# Patient Record
Sex: FEMALE | Race: BLACK OR AFRICAN AMERICAN | NOT HISPANIC OR LATINO | URBAN - METROPOLITAN AREA
[De-identification: names, ages, dates, MRNs, and addresses within clinical notes are randomized per-mention and may not be internally consistent; named-entity substitution may affect disease eponyms.]

---

## 2019-07-30 ENCOUNTER — EMERGENCY (EMERGENCY)
Facility: HOSPITAL | Age: 29
LOS: 1 days | Discharge: HOME | End: 2019-07-30
Attending: EMERGENCY MEDICINE | Admitting: EMERGENCY MEDICINE
Payer: MEDICAID

## 2019-07-30 VITALS
RESPIRATION RATE: 16 BRPM | HEART RATE: 66 BPM | TEMPERATURE: 98 F | OXYGEN SATURATION: 100 % | SYSTOLIC BLOOD PRESSURE: 124 MMHG | DIASTOLIC BLOOD PRESSURE: 81 MMHG

## 2019-07-30 VITALS
HEART RATE: 69 BPM | SYSTOLIC BLOOD PRESSURE: 132 MMHG | TEMPERATURE: 98 F | WEIGHT: 149.91 LBS | HEIGHT: 68 IN | DIASTOLIC BLOOD PRESSURE: 85 MMHG | OXYGEN SATURATION: 99 % | RESPIRATION RATE: 18 BRPM

## 2019-07-30 DIAGNOSIS — Z98.890 OTHER SPECIFIED POSTPROCEDURAL STATES: Chronic | ICD-10-CM

## 2019-07-30 DIAGNOSIS — N80.1 ENDOMETRIOSIS OF OVARY: Chronic | ICD-10-CM

## 2019-07-30 LAB
ALBUMIN SERPL ELPH-MCNC: 3.9 G/DL — SIGNIFICANT CHANGE UP (ref 3.4–5)
ALP SERPL-CCNC: 75 U/L — SIGNIFICANT CHANGE UP (ref 40–120)
ALT FLD-CCNC: 23 U/L — SIGNIFICANT CHANGE UP (ref 12–42)
ANION GAP SERPL CALC-SCNC: 9 MMOL/L — SIGNIFICANT CHANGE UP (ref 9–16)
APPEARANCE UR: CLEAR — SIGNIFICANT CHANGE UP
AST SERPL-CCNC: 20 U/L — SIGNIFICANT CHANGE UP (ref 15–37)
BASOPHILS NFR BLD AUTO: 1.2 % — SIGNIFICANT CHANGE UP (ref 0–2)
BILIRUB SERPL-MCNC: 0.3 MG/DL — SIGNIFICANT CHANGE UP (ref 0.2–1.2)
BILIRUB UR-MCNC: NEGATIVE — SIGNIFICANT CHANGE UP
BUN SERPL-MCNC: 14 MG/DL — SIGNIFICANT CHANGE UP (ref 7–23)
CALCIUM SERPL-MCNC: 8.5 MG/DL — SIGNIFICANT CHANGE UP (ref 8.5–10.5)
CHLORIDE SERPL-SCNC: 106 MMOL/L — SIGNIFICANT CHANGE UP (ref 96–108)
CO2 SERPL-SCNC: 24 MMOL/L — SIGNIFICANT CHANGE UP (ref 22–31)
COLOR SPEC: YELLOW — SIGNIFICANT CHANGE UP
CREAT SERPL-MCNC: 0.7 MG/DL — SIGNIFICANT CHANGE UP (ref 0.5–1.3)
DIFF PNL FLD: ABNORMAL
EOSINOPHIL NFR BLD AUTO: 3 % — SIGNIFICANT CHANGE UP (ref 0–6)
GLUCOSE SERPL-MCNC: 85 MG/DL — SIGNIFICANT CHANGE UP (ref 70–99)
GLUCOSE UR QL: NEGATIVE — SIGNIFICANT CHANGE UP
HCG UR QL: NEGATIVE — SIGNIFICANT CHANGE UP
HCT VFR BLD CALC: 32.8 % — LOW (ref 34.5–45)
HGB BLD-MCNC: 10.5 G/DL — LOW (ref 11.5–15.5)
IMM GRANULOCYTES NFR BLD AUTO: 0.2 % — SIGNIFICANT CHANGE UP (ref 0–1.5)
KETONES UR-MCNC: ABNORMAL MG/DL
LEUKOCYTE ESTERASE UR-ACNC: NEGATIVE — SIGNIFICANT CHANGE UP
LIDOCAIN IGE QN: 174 U/L — SIGNIFICANT CHANGE UP (ref 73–393)
LYMPHOCYTES # BLD AUTO: 44.9 % — HIGH (ref 13–44)
MAGNESIUM SERPL-MCNC: 2 MG/DL — SIGNIFICANT CHANGE UP (ref 1.6–2.6)
MCHC RBC-ENTMCNC: 25.2 PG — LOW (ref 27–34)
MCHC RBC-ENTMCNC: 32 G/DL — SIGNIFICANT CHANGE UP (ref 32–36)
MCV RBC AUTO: 78.7 FL — LOW (ref 80–100)
MONOCYTES NFR BLD AUTO: 8.6 % — SIGNIFICANT CHANGE UP (ref 2–14)
NEUTROPHILS NFR BLD AUTO: 42.1 % — LOW (ref 43–77)
NITRITE UR-MCNC: NEGATIVE — SIGNIFICANT CHANGE UP
PH UR: 5.5 — SIGNIFICANT CHANGE UP (ref 5–8)
PLATELET # BLD AUTO: 259 K/UL — SIGNIFICANT CHANGE UP (ref 150–400)
POTASSIUM SERPL-MCNC: 3.9 MMOL/L — SIGNIFICANT CHANGE UP (ref 3.5–5.3)
POTASSIUM SERPL-SCNC: 3.9 MMOL/L — SIGNIFICANT CHANGE UP (ref 3.5–5.3)
PROT SERPL-MCNC: 7.6 G/DL — SIGNIFICANT CHANGE UP (ref 6.4–8.2)
PROT UR-MCNC: ABNORMAL MG/DL
RBC # BLD: 4.17 M/UL — SIGNIFICANT CHANGE UP (ref 3.8–5.2)
RBC # FLD: 15.4 % — HIGH (ref 10.3–14.5)
SODIUM SERPL-SCNC: 139 MMOL/L — SIGNIFICANT CHANGE UP (ref 132–145)
SP GR SPEC: >=1.03 — SIGNIFICANT CHANGE UP (ref 1–1.03)
UROBILINOGEN FLD QL: 0.2 E.U./DL — SIGNIFICANT CHANGE UP
WBC # BLD: 4.3 K/UL — SIGNIFICANT CHANGE UP (ref 3.8–10.5)
WBC # FLD AUTO: 4.3 K/UL — SIGNIFICANT CHANGE UP (ref 3.8–10.5)

## 2019-07-30 PROCEDURE — 76856 US EXAM PELVIC COMPLETE: CPT | Mod: 26

## 2019-07-30 PROCEDURE — 99284 EMERGENCY DEPT VISIT MOD MDM: CPT

## 2019-07-30 PROCEDURE — 76830 TRANSVAGINAL US NON-OB: CPT | Mod: 26

## 2019-07-30 NOTE — ED PROVIDER NOTE - PLAN OF CARE
28f w anemia & endometriosis hx p/w 1 mo of vaginal bleeding. mild bleeding on exam. nontoxic appearing, n/v intact. no abd/pelvic tender. --Labs, US pelvis, Analgesia/antiemetics as needed, observe/re-assess, likely dc w symptomatic and supportive care, f/u Gyn/PMD.

## 2019-07-30 NOTE — ED PROVIDER NOTE - PHYSICAL EXAMINATION
Physical Exam  General: Awake, alert, NAD, WDWN, non-toxic appearing, NCAT  Eyes: PERRL, EOMI, no icterus, lids and conjunctivae are normal  ENT: External inspection normal, pink/moist membranes, pharynx normal  CV: S1S2, regular rate and rhythm, no murmur/gallops/rubs, no JVD, 2+ pulses b/l, no edema/cords/homans, warm/well-perfused  Respiratory: Normal respiratory rate/effort, no respiratory distress, normal voice, speaking full sentences, lungs clear to auscultation b/l, no wheezing/rales/rhonchi, no retractions, no stridor  Abdomen: Soft abdomen, no tender/distended/guarding/rebound, no CVA tender  : Normal external genitals, no external/internal lesions, os closed, no cmt, no fundal/adnexal tender, no fundal/adnexal mass, mild bleeding, assisted by: NATALIYA Monk  Musculoskeletal: FROM all 4 extremities, N/V intact, stable gait  Neck: FROM neck, supple, no meningismus, trachea midline, no JVD  Integumentary: Color normal for race, warm and dry, no rash  Neuro: Oriented x3, CN 2-12 grossly intact, normal motor, normal sensory  Psych: Oriented x3, mood normal, affect normal

## 2019-07-30 NOTE — ED PROVIDER NOTE - OBJECTIVE STATEMENT
28f w a hx of anemia & endometriosis s/p endometrioma resection. Pt reports vaginal bleeding since 7/1. Pt also having pelvic cramping. Symptoms are moderate, constant, no exacerbating. Pt took ASA today for her pain w improvement. No hx of easy bleeding/bruising.

## 2019-07-30 NOTE — ED PROVIDER NOTE - CLINICAL SUMMARY MEDICAL DECISION MAKING FREE TEXT BOX
28f w anemia & endometriosis hx p/w 1 mo of vaginal bleeding. mild bleeding on exam. nontoxic appearing, n/v intact. no abd/pelvic tender. Labs & imaging reviewed. Pt advised regarding symptomatic/supportive care, importance of Gyn/PMD f/u, and symptoms to prompt ED return. Copy of results given to patient.

## 2019-07-30 NOTE — ED PROVIDER NOTE - NSFOLLOWUPCLINICS_GEN_ALL_ED_FT
A Family Medicine Doctor  Family Medicine  .  [place]   Phone:   Fax:   Follow Up Time: 1-3 Days    An OB/GYN physician  Obstetrics & Gynecology  .  [place]   Phone:   Fax:   Follow Up Time: 1-3 Days    [facility]  Family Medicine  [address] [place] [address], [address]nd [place] [address]  Phone: [phone]  Fax:   Follow Up Time: 1-3 Days A Family Medicine Doctor  Family Medicine  .  [place]   Phone:   Fax:   Follow Up Time: 1-3 Days    An OB/GYN physician  Obstetrics & Gynecology  .  [place]   Phone:   Fax:   Follow Up Time: 1-3 Days    [facility]  Family Medicine  [address] [place] [address], [address]nd [place] [address]  Phone: [phone]  Fax:   Follow Up Time: 1-3 Days

## 2019-07-30 NOTE — ED PROVIDER NOTE - NS ED ROS FT
Review of Systems  Constitutional:  No fever or chills.   Eyes:  Negative.   ENMT:  No nasal congestion, discharge, or throat pain.   Cardiac:  No chest pain, palpitations, syncope, or edema.  Respiratory:  No dyspnea, wheezing, or cough. No hemoptysis.  GI:  No vomiting, diarrhea, melena, or hematochezia.  :  No dysuria or hematuria. +Vaginal bleeding  Musculoskeletal:  No gait abnormality, joint swelling, joint pain, or back pain.  Skin:  No skin rash, jaundice, or lesions.  Neuro:  No headache, lightheaded/dizziness, loss of sensation, or focal weakness.  No change in mental status.

## 2019-07-30 NOTE — ED PROVIDER NOTE - CARE PROVIDER_API CALL
Your Primary Care Physician,   Phone: (   )    -  Fax: (   )    -  Follow Up Time: 1-3 Days    Your OB/Gyn,   Phone: (   )    -  Fax: (   )    -  Follow Up Time: 1-3 Days Your Primary Care Physician,   Phone: (   )    -  Fax: (   )    -  Follow Up Time: 1-3 Days    Your OB/Gyn,   Phone: (   )    -  Fax: (   )    -  Follow Up Time: 1-3 Days    Davidson García)  Obstetrics and Gynecology  [address] [place] [address]  Phone: [phone]  Fax: [phone]  Follow Up Time: 4-6 Days

## 2019-07-30 NOTE — ED PROVIDER NOTE - NSFOLLOWUPINSTRUCTIONS_ED_ALL_ED_FT
Return to the ER for worsening or concerning symptoms.    See printed discharge information sheets for further instructions on care for your condition.    Take Ibuprofen 600mg every 6 hours as needed for pain  Take Acetaminophen 650mg every 6 hours as needed for pain    Abnormal Uterine Bleeding  Abnormal uterine bleeding is unusual bleeding from the uterus. It includes:    Bleeding or spotting between periods.  Bleeding after sex.  Bleeding that is heavier than normal.  Periods that last longer than usual.  Bleeding after menopause.    Abnormal uterine bleeding can affect women at various stages in life, including teenagers, women in their reproductive years, pregnant women, and women who have reached menopause. Common causes of abnormal uterine bleeding include:    Pregnancy.  Growths of tissue (polyps).  A noncancerous tumor in the uterus (fibroid).  Infection.  Cancer.  Hormonal imbalances.    Any type of abnormal bleeding should be evaluated by a health care provider. Many cases are minor and simple to treat, while others are more serious. Treatment will depend on the cause of the bleeding.    Follow these instructions at home:  Monitor your condition for any changes.  Do not use tampons, douche, or have sex if told by your health care provider.  Change your pads often.  Get regular exams that include pelvic exams and cervical cancer screening.  Keep all follow-up visits as told by your health care provider. This is important.  Contact a health care provider if:  Your bleeding lasts for more than one week.  You feel dizzy at times.  You feel nauseous or you vomit.  Get help right away if:  You pass out.  Your bleeding soaks through a pad every hour.  You have abdominal pain.  You have a fever.  You become sweaty or weak.  You pass large blood clots from your vagina.  Summary  Abnormal uterine bleeding is unusual bleeding from the uterus.  Any type of abnormal bleeding should be evaluated by a health care provider. Many cases are minor and simple to treat, while others are more serious.  Treatment will depend on the cause of the bleeding.  This information is not intended to replace advice given to you by your health care provider. Make sure you discuss any questions you have with your health care provider.

## 2019-07-30 NOTE — ED ADULT TRIAGE NOTE - CHIEF COMPLAINT QUOTE
pt states shes had her menses since July 1st. Pt states she is not bleeding through multiple pads daily  Also states she has bilateral lower abdominal  pain

## 2019-07-30 NOTE — ED PROVIDER NOTE - CARE PROVIDERS DIRECT ADDRESSES
,DirectAddress_Unknown,DirectAddress_Unknown ,DirectAddress_Unknown,DirectAddress_Unknown,[email or web address]

## 2019-07-30 NOTE — ED PROVIDER NOTE - CARE PLAN
Principal Discharge DX:	Vaginal bleeding  Assessment and plan of treatment:	28f w anemia & endometriosis hx p/w 1 mo of vaginal bleeding. mild bleeding on exam. nontoxic appearing, n/v intact. no abd/pelvic tender. --Labs, US pelvis, Analgesia/antiemetics as needed, observe/re-assess, likely dc w symptomatic and supportive care, f/u Gyn/PMD.

## 2019-08-08 DIAGNOSIS — N93.9 ABNORMAL UTERINE AND VAGINAL BLEEDING, UNSPECIFIED: ICD-10-CM

## 2020-01-16 ENCOUNTER — EMERGENCY (EMERGENCY)
Facility: HOSPITAL | Age: 30
LOS: 1 days | Discharge: HOME | End: 2020-01-16
Admitting: EMERGENCY MEDICINE
Payer: MEDICAID

## 2020-01-16 VITALS
HEIGHT: 68 IN | SYSTOLIC BLOOD PRESSURE: 125 MMHG | RESPIRATION RATE: 16 BRPM | HEART RATE: 79 BPM | OXYGEN SATURATION: 100 % | TEMPERATURE: 98 F | DIASTOLIC BLOOD PRESSURE: 86 MMHG | WEIGHT: 154.98 LBS

## 2020-01-16 VITALS
HEART RATE: 71 BPM | RESPIRATION RATE: 16 BRPM | SYSTOLIC BLOOD PRESSURE: 118 MMHG | TEMPERATURE: 98 F | DIASTOLIC BLOOD PRESSURE: 80 MMHG | OXYGEN SATURATION: 100 %

## 2020-01-16 DIAGNOSIS — N80.1 ENDOMETRIOSIS OF OVARY: Chronic | ICD-10-CM

## 2020-01-16 DIAGNOSIS — Z98.890 OTHER SPECIFIED POSTPROCEDURAL STATES: Chronic | ICD-10-CM

## 2020-01-16 PROBLEM — N80.9 ENDOMETRIOSIS, UNSPECIFIED: Chronic | Status: ACTIVE | Noted: 2019-07-30

## 2020-01-16 PROBLEM — D64.9 ANEMIA, UNSPECIFIED: Chronic | Status: ACTIVE | Noted: 2019-07-30

## 2020-01-16 LAB
ALBUMIN SERPL ELPH-MCNC: 4 G/DL — SIGNIFICANT CHANGE UP (ref 3.4–5)
ALP SERPL-CCNC: 79 U/L — SIGNIFICANT CHANGE UP (ref 40–120)
ALT FLD-CCNC: 30 U/L — SIGNIFICANT CHANGE UP (ref 12–42)
ANION GAP SERPL CALC-SCNC: 7 MMOL/L — LOW (ref 9–16)
APPEARANCE UR: CLEAR — SIGNIFICANT CHANGE UP
APPEARANCE UR: CLEAR — SIGNIFICANT CHANGE UP
APTT BLD: 32.5 SEC — SIGNIFICANT CHANGE UP (ref 27.5–36.3)
AST SERPL-CCNC: 22 U/L — SIGNIFICANT CHANGE UP (ref 15–37)
BASOPHILS # BLD AUTO: 0.04 K/UL — SIGNIFICANT CHANGE UP (ref 0–0.2)
BASOPHILS NFR BLD AUTO: 1 % — SIGNIFICANT CHANGE UP (ref 0–2)
BILIRUB SERPL-MCNC: 0.3 MG/DL — SIGNIFICANT CHANGE UP (ref 0.2–1.2)
BILIRUB UR-MCNC: NEGATIVE — SIGNIFICANT CHANGE UP
BILIRUB UR-MCNC: NEGATIVE — SIGNIFICANT CHANGE UP
BUN SERPL-MCNC: 14 MG/DL — SIGNIFICANT CHANGE UP (ref 7–23)
CALCIUM SERPL-MCNC: 8.8 MG/DL — SIGNIFICANT CHANGE UP (ref 8.5–10.5)
CHLORIDE SERPL-SCNC: 103 MMOL/L — SIGNIFICANT CHANGE UP (ref 96–108)
CO2 SERPL-SCNC: 29 MMOL/L — SIGNIFICANT CHANGE UP (ref 22–31)
COLOR SPEC: YELLOW — SIGNIFICANT CHANGE UP
COLOR SPEC: YELLOW — SIGNIFICANT CHANGE UP
CREAT SERPL-MCNC: 0.72 MG/DL — SIGNIFICANT CHANGE UP (ref 0.5–1.3)
DIFF PNL FLD: NEGATIVE — SIGNIFICANT CHANGE UP
DIFF PNL FLD: NEGATIVE — SIGNIFICANT CHANGE UP
EOSINOPHIL # BLD AUTO: 0.15 K/UL — SIGNIFICANT CHANGE UP (ref 0–0.5)
EOSINOPHIL NFR BLD AUTO: 3.7 % — SIGNIFICANT CHANGE UP (ref 0–6)
GLUCOSE SERPL-MCNC: 98 MG/DL — SIGNIFICANT CHANGE UP (ref 70–99)
GLUCOSE UR QL: NEGATIVE — SIGNIFICANT CHANGE UP
GLUCOSE UR QL: NEGATIVE — SIGNIFICANT CHANGE UP
HCG SERPL-ACNC: 1 MIU/ML — SIGNIFICANT CHANGE UP
HCT VFR BLD CALC: 34.2 % — LOW (ref 34.5–45)
HGB BLD-MCNC: 10.7 G/DL — LOW (ref 11.5–15.5)
IMM GRANULOCYTES NFR BLD AUTO: 0.2 % — SIGNIFICANT CHANGE UP (ref 0–1.5)
INR BLD: 1.09 — SIGNIFICANT CHANGE UP (ref 0.88–1.16)
KETONES UR-MCNC: NEGATIVE — SIGNIFICANT CHANGE UP
KETONES UR-MCNC: NEGATIVE — SIGNIFICANT CHANGE UP
LACTATE SERPL-SCNC: 0.4 MMOL/L — SIGNIFICANT CHANGE UP (ref 0.4–2)
LEUKOCYTE ESTERASE UR-ACNC: ABNORMAL
LEUKOCYTE ESTERASE UR-ACNC: ABNORMAL
LIDOCAIN IGE QN: 138 U/L — SIGNIFICANT CHANGE UP (ref 73–393)
LYMPHOCYTES # BLD AUTO: 1.73 K/UL — SIGNIFICANT CHANGE UP (ref 1–3.3)
LYMPHOCYTES # BLD AUTO: 43.1 % — SIGNIFICANT CHANGE UP (ref 13–44)
MAGNESIUM SERPL-MCNC: 1.7 MG/DL — SIGNIFICANT CHANGE UP (ref 1.6–2.6)
MCHC RBC-ENTMCNC: 24.3 PG — LOW (ref 27–34)
MCHC RBC-ENTMCNC: 31.3 GM/DL — LOW (ref 32–36)
MCV RBC AUTO: 77.6 FL — LOW (ref 80–100)
MONOCYTES # BLD AUTO: 0.36 K/UL — SIGNIFICANT CHANGE UP (ref 0–0.9)
MONOCYTES NFR BLD AUTO: 9 % — SIGNIFICANT CHANGE UP (ref 2–14)
NEUTROPHILS # BLD AUTO: 1.72 K/UL — LOW (ref 1.8–7.4)
NEUTROPHILS NFR BLD AUTO: 43 % — SIGNIFICANT CHANGE UP (ref 43–77)
NITRITE UR-MCNC: NEGATIVE — SIGNIFICANT CHANGE UP
NITRITE UR-MCNC: NEGATIVE — SIGNIFICANT CHANGE UP
NRBC # BLD: 0 /100 WBCS — SIGNIFICANT CHANGE UP (ref 0–0)
PH UR: 5.5 — SIGNIFICANT CHANGE UP (ref 5–8)
PH UR: 5.5 — SIGNIFICANT CHANGE UP (ref 5–8)
PLATELET # BLD AUTO: 272 K/UL — SIGNIFICANT CHANGE UP (ref 150–400)
POTASSIUM SERPL-MCNC: 4.2 MMOL/L — SIGNIFICANT CHANGE UP (ref 3.5–5.3)
POTASSIUM SERPL-SCNC: 4.2 MMOL/L — SIGNIFICANT CHANGE UP (ref 3.5–5.3)
PROT SERPL-MCNC: 7.6 G/DL — SIGNIFICANT CHANGE UP (ref 6.4–8.2)
PROT UR-MCNC: NEGATIVE MG/DL — SIGNIFICANT CHANGE UP
PROT UR-MCNC: NEGATIVE MG/DL — SIGNIFICANT CHANGE UP
PROTHROM AB SERPL-ACNC: 12.1 SEC — SIGNIFICANT CHANGE UP (ref 10–12.9)
RBC # BLD: 4.41 M/UL — SIGNIFICANT CHANGE UP (ref 3.8–5.2)
RBC # FLD: 16.2 % — HIGH (ref 10.3–14.5)
SODIUM SERPL-SCNC: 139 MMOL/L — SIGNIFICANT CHANGE UP (ref 132–145)
SP GR SPEC: 1.01 — SIGNIFICANT CHANGE UP (ref 1–1.03)
SP GR SPEC: >=1.03 — SIGNIFICANT CHANGE UP (ref 1–1.03)
UROBILINOGEN FLD QL: 0.2 E.U./DL — SIGNIFICANT CHANGE UP
UROBILINOGEN FLD QL: 0.2 E.U./DL — SIGNIFICANT CHANGE UP
WBC # BLD: 4.01 K/UL — SIGNIFICANT CHANGE UP (ref 3.8–10.5)
WBC # FLD AUTO: 4.01 K/UL — SIGNIFICANT CHANGE UP (ref 3.8–10.5)

## 2020-01-16 PROCEDURE — 76856 US EXAM PELVIC COMPLETE: CPT | Mod: 26

## 2020-01-16 PROCEDURE — 74177 CT ABD & PELVIS W/CONTRAST: CPT | Mod: 26

## 2020-01-16 PROCEDURE — 76830 TRANSVAGINAL US NON-OB: CPT | Mod: 26

## 2020-01-16 PROCEDURE — 99284 EMERGENCY DEPT VISIT MOD MDM: CPT

## 2020-01-16 RX ORDER — IOHEXOL 300 MG/ML
30 INJECTION, SOLUTION INTRAVENOUS ONCE
Refills: 0 | Status: COMPLETED | OUTPATIENT
Start: 2020-01-16 | End: 2020-01-16

## 2020-01-16 RX ORDER — SODIUM CHLORIDE 9 MG/ML
1000 INJECTION INTRAMUSCULAR; INTRAVENOUS; SUBCUTANEOUS ONCE
Refills: 0 | Status: COMPLETED | OUTPATIENT
Start: 2020-01-16 | End: 2020-01-16

## 2020-01-16 RX ORDER — SODIUM CHLORIDE 9 MG/ML
3 INJECTION INTRAMUSCULAR; INTRAVENOUS; SUBCUTANEOUS ONCE
Refills: 0 | Status: COMPLETED | OUTPATIENT
Start: 2020-01-16 | End: 2020-01-16

## 2020-01-16 RX ORDER — ACETAMINOPHEN 500 MG
650 TABLET ORAL ONCE
Refills: 0 | Status: DISCONTINUED | OUTPATIENT
Start: 2020-01-16 | End: 2020-01-16

## 2020-01-16 RX ADMIN — IOHEXOL 30 MILLILITER(S): 300 INJECTION, SOLUTION INTRAVENOUS at 09:01

## 2020-01-16 RX ADMIN — SODIUM CHLORIDE 1000 MILLILITER(S): 9 INJECTION INTRAMUSCULAR; INTRAVENOUS; SUBCUTANEOUS at 10:22

## 2020-01-16 RX ADMIN — SODIUM CHLORIDE 3 MILLILITER(S): 9 INJECTION INTRAMUSCULAR; INTRAVENOUS; SUBCUTANEOUS at 09:02

## 2020-01-16 NOTE — ED PROVIDER NOTE - CLINICAL SUMMARY MEDICAL DECISION MAKING FREE TEXT BOX
24 y/o F presents to ED c/o lower abd pain.  Pt well appearing, VSS, NAD.  Abd soft, + mid lower abd TTP.

## 2020-01-16 NOTE — ED PROVIDER NOTE - OBJECTIVE STATEMENT
28 y/o F with PMH of endometriosis presents to ED c/o lower abd pain.  Pt states her pain is mid lower abd.  It is intermittent and worsening.  She states she had similar pain 3 years ago and it was related to endometriosis.  She denies fevers/chills, cough, chest pain, n/v/d, dysuria, hematuria, sick contacts, vaginal discharge.  Pt is sexually active with one male partner and denies concerns for STIs.

## 2020-01-16 NOTE — ED PROVIDER NOTE - PATIENT PORTAL LINK FT
You can access the FollowMyHealth Patient Portal offered by [facility] by registering at the following website: [email or web address]. By joining [employer]’s FollowMyHealth portal, you will also be able to view your health information using other applications (apps) compatible with our system.

## 2020-01-16 NOTE — ED PROVIDER NOTE - NSFOLLOWUPINSTRUCTIONS_ED_ALL_ED_FT
Follow up with gynecology and gastroenterology.    Return for fever, vomiting, blood in stool or other concerns.

## 2020-01-22 DIAGNOSIS — R10.30 LOWER ABDOMINAL PAIN, UNSPECIFIED: ICD-10-CM

## 2021-06-20 NOTE — ED ADULT TRIAGE NOTE - TEMPERATURE IN FAHRENHEIT (DEGREES F)
Letter by Laura Castro CNP at      Author: Laura Castro CNP Service: -- Author Type: --    Filed:  Encounter Date: 7/30/2020 Status: (Other)        Brooke Murcia  [address] [place] [address]             August 6, 2020         Dear Brooke Murcia,    Unfortunately, we were not able to reach you by phone.    You will need a Physical in person before paper work can be filled out.     Please call us at [phone] to schedule a check-up.       Electronically signed by Luara Castro CNP       
98.2

## 2022-03-31 ENCOUNTER — EMERGENCY (EMERGENCY)
Facility: HOSPITAL | Age: 32
LOS: 1 days | Discharge: HOME | End: 2022-03-31
Admitting: EMERGENCY MEDICINE
Payer: MEDICAID

## 2022-03-31 VITALS
WEIGHT: 171.96 LBS | DIASTOLIC BLOOD PRESSURE: 86 MMHG | OXYGEN SATURATION: 98 % | HEIGHT: 68 IN | RESPIRATION RATE: 16 BRPM | TEMPERATURE: 98 F | HEART RATE: 82 BPM | SYSTOLIC BLOOD PRESSURE: 123 MMHG

## 2022-03-31 DIAGNOSIS — N80.1 ENDOMETRIOSIS OF OVARY: Chronic | ICD-10-CM

## 2022-03-31 DIAGNOSIS — S05.01XA INJURY OF CONJUNCTIVA AND CORNEAL ABRASION WITHOUT FOREIGN BODY, RIGHT EYE, INITIAL ENCOUNTER: ICD-10-CM

## 2022-03-31 DIAGNOSIS — Z98.890 OTHER SPECIFIED POSTPROCEDURAL STATES: Chronic | ICD-10-CM

## 2022-03-31 DIAGNOSIS — H57.11 OCULAR PAIN, RIGHT EYE: ICD-10-CM
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
## 2022-03-31 PROCEDURE — 99283 EMERGENCY DEPT VISIT LOW MDM: CPT

## 2022-03-31 RX ORDER — ACETAMINOPHEN 500 MG
975 TABLET ORAL ONCE
Refills: 0 | Status: COMPLETED | OUTPATIENT
Start: 2022-03-31 | End: 2022-03-31

## 2022-03-31 RX ORDER — IBUPROFEN 200 MG
800 TABLET ORAL ONCE
Refills: 0 | Status: COMPLETED | OUTPATIENT
Start: 2022-03-31 | End: 2022-03-31

## 2022-03-31 RX ORDER — POLYMYXIN B SULF/TRIMETHOPRIM 10000-1/ML
1 DROPS OPHTHALMIC (EYE) ONCE
Refills: 0 | Status: COMPLETED | OUTPATIENT
Start: 2022-03-31 | End: 2022-03-31

## 2022-03-31 RX ADMIN — Medication 800 MILLIGRAM(S): at 18:26

## 2022-03-31 RX ADMIN — Medication 1 DROP(S): at 18:26

## 2022-03-31 RX ADMIN — Medication 975 MILLIGRAM(S): at 18:27

## 2022-03-31 NOTE — ED PROVIDER NOTE - PHYSICAL EXAMINATION
CONSTITUTIONAL: Well-appearing; well-nourished; in no apparent distress.   	HEAD: Normocephalic; atraumatic.   	right eye tearing, conjunctiva clear. +PERRL, EOM intact. 20/20 with corrective lens. florescein with slit lamp shows no fb, +corneal uptake 6 oclock neg seidels, consistent with superficial corneal abrasion.   	NEURO: A & O x 3; face symmetric; grossly unremarkable.   PSYCHOLOGICAL: The patient’s mood and manner are appropriate.

## 2022-03-31 NOTE — ED PROVIDER NOTE - OBJECTIVE STATEMENT
32 yo female c/o right eye irritation after poking herself in the eye accidently, potentially scratched corneal surface, concerned for corneal abrasion. tetanus utd. does not wear contact lens. wears corrective lens.

## 2022-03-31 NOTE — ED ADULT NURSE NOTE - OBJECTIVE STATEMENT
Pt presents to ED complaining of poking herself in the R eye. Pt has hx of glaucoma. Denies visual changes, or drainage from eye. Complains of pain to R eyelid.

## 2022-03-31 NOTE — ED PROVIDER NOTE - CARE PROVIDER_API CALL
Tomas Santos  OPHTHALMOLOGY  [address] [address]  Phone: [phone]  Fax: [phone]  Follow Up Time:

## 2022-03-31 NOTE — ED ADULT NURSE NOTE - NSIMPLEMENTINTERV_GEN_ALL_ED
Implemented All Universal Safety Interventions:  [place] to call system. Call bell, personal items and telephone within reach. Instruct patient to call for assistance. Room bathroom lighting operational. Non-slip footwear when patient is off stretcher. Physically safe environment: no spills, clutter or unnecessary equipment. Stretcher in lowest position, wheels locked, appropriate side rails in place.

## 2022-03-31 NOTE — ED PROVIDER NOTE - PATIENT PORTAL LINK FT
You can access the FollowMyHealth Patient Portal offered by [facility] by registering at the following website: [email or web address]. By joining [employer]’s FollowMyHealth portal, you will also be able to view your health information using other applications (apps) compatible with our system.

## 2022-03-31 NOTE — ED PROVIDER NOTE - NSFOLLOWUPINSTRUCTIONS_ED_ALL_ED_FT
Apply polytrim 1 drop every 6 hours for 7 days  Take Ibuprofen 600mg every 6-8 hours as needed for pain, take with food, and in addition you may take Tylenol 500 mg every 6-8 hours as needed for pain over the counter    The cornea is the clear covering at the front and center of the eye. This very thin tissue is made up of many layers. If a scratch or injury causes the corneal epithelium to come off, it is called a corneal abrasion. Symptoms include eye pain, redness, tearing, difficulty keeping eye open, and light sensitivity. Do not drive or operate machinery if your eye is patched.  Antibiotic eye drops may be prescribed to reduce the risk of infection.  It is important to follow up with an ophthalmologist (eye doctor) to ensure proper healing.    SEEK IMMEDIATE MEDICAL CARE IF YOU HAVE ANY OF THE FOLLOWING SYMPTOMS: discharge from eyes, changes in vision, fever, or swelling.

## 2022-03-31 NOTE — ED PROVIDER NOTE - CLINICAL SUMMARY MEDICAL DECISION MAKING FREE TEXT BOX
right corneal abrasion, tetanus utd, pain controlled, will give polytrim drops in ED, otc pain meds, f/u eye. return precautions discussed

## 2022-07-26 ENCOUNTER — NON-APPOINTMENT (OUTPATIENT)
Age: 32
End: 2022-07-26

## 2023-02-17 PROBLEM — Z00.00 ENCOUNTER FOR PREVENTIVE HEALTH EXAMINATION: Status: ACTIVE | Noted: 2023-02-17

## 2023-03-20 ENCOUNTER — APPOINTMENT (OUTPATIENT)
Dept: ORTHOPEDIC SURGERY | Facility: CLINIC | Age: 33
End: 2023-03-20
Payer: MEDICAID

## 2023-03-20 VITALS
SYSTOLIC BLOOD PRESSURE: 120 MMHG | WEIGHT: 174 LBS | HEART RATE: 86 BPM | HEIGHT: 68 IN | DIASTOLIC BLOOD PRESSURE: 71 MMHG | BODY MASS INDEX: 26.37 KG/M2 | OXYGEN SATURATION: 98 %

## 2023-03-20 PROCEDURE — 99203 OFFICE O/P NEW LOW 30 MIN: CPT

## 2023-03-20 PROCEDURE — 72084 X-RAY EXAM ENTIRE SPI 6/> VW: CPT

## 2023-03-20 NOTE — HISTORY OF PRESENT ILLNESS
[de-identified] : 1 year history of mid back pain, also with low back pain.  some radiation posteriorly to the lower extremities.  no numbness/tingling.  able to exercise if needed.  ibuprofen daily, with little relief.\par \par has thyroid condition\par

## 2023-03-20 NOTE — DISCUSSION/SUMMARY
[de-identified] : thoracic pain, lumbar pain\par \par MRI thoracic and lumbar ordered\par follow up after imaging\par all questions answered

## 2023-03-20 NOTE — PHYSICAL EXAM
[de-identified] : Physical Exam:\par \par General: patient is well developed, well nourished, in no acute \par distress, alert and oriented x 3. \par \par Mood and affect: normal\par \par Respiratory: no respiratory distress noted\par \par Skin: no scars over spine, skin intact, no erythema, increased warmth\par \par Alignment:The spine is well compensated in the coronal and sagittal plane.  \par \par Gait: The patient is able to toe walk and heel walk without difficulty. The patient is able to tandem gait without difficutly.\par \par Palpation: no tenderness to palpation spine or paraspinal region\par \par Range of motion: Lumbar spine ROM is restricted\par \par Neurologic Exam:\par Motor: Manual Muscle testing in the lower extremities is 5 out of 5 in all muscle groups. There is no evidence of muscular atrophy in the lower extremities. Sensory: Sensation to light touch is grossly intact in the lower extremities\par \par Reflexes: DTR are present and symmetric throughout, no clonus, plantar responses are flexor\par \par Hip Exam: No pain with internal or external rotation of hips bilaterally\par \par Special tests: Straight leg raise test negative.  Cross straight leg test negative.  CHRISTINE test negative\par \par Vascular: Examination of the peripheral vascular system demonstrates no evidence of congestion or edema. no evidence of lymphedema bilateral lower extremities, pulses are present and symmetric in both lower extremities. [de-identified] : Xray 3/20/23: thoracic scoliosis; no disc degeneration, no fractures

## 2023-03-27 ENCOUNTER — APPOINTMENT (OUTPATIENT)
Dept: UROLOGY | Facility: CLINIC | Age: 33
End: 2023-03-27
Payer: MEDICAID

## 2023-03-27 VITALS
TEMPERATURE: 98.4 F | HEART RATE: 82 BPM | DIASTOLIC BLOOD PRESSURE: 82 MMHG | OXYGEN SATURATION: 98 % | SYSTOLIC BLOOD PRESSURE: 126 MMHG

## 2023-03-27 DIAGNOSIS — Z86.39 PERSONAL HISTORY OF OTHER ENDOCRINE, NUTRITIONAL AND METABOLIC DISEASE: ICD-10-CM

## 2023-03-27 DIAGNOSIS — R35.0 FREQUENCY OF MICTURITION: ICD-10-CM

## 2023-03-27 DIAGNOSIS — Z86.69 PERSONAL HISTORY OF OTHER DISEASES OF THE NERVOUS SYSTEM AND SENSE ORGANS: ICD-10-CM

## 2023-03-27 PROCEDURE — 99204 OFFICE O/P NEW MOD 45 MIN: CPT

## 2023-03-27 RX ORDER — IBUPROFEN 400 MG/1
400 TABLET, FILM COATED ORAL
Refills: 0 | Status: ACTIVE | COMMUNITY

## 2023-03-27 NOTE — PHYSICAL EXAM
[General Appearance - Well Developed] : well developed [General Appearance - Well Nourished] : well nourished [Normal Appearance] : normal appearance [Well Groomed] : well groomed [General Appearance - In No Acute Distress] : no acute distress [Edema] : no peripheral edema [Respiration, Rhythm And Depth] : normal respiratory rhythm and effort [Exaggerated Use Of Accessory Muscles For Inspiration] : no accessory muscle use [Abdomen Soft] : soft [Abdomen Tenderness] : non-tender [Costovertebral Angle Tenderness] : no ~M costovertebral angle tenderness [Urethral Meatus] : normal urethra [Urinary Bladder Findings] : the bladder was normal on palpation [External Female Genitalia] : normal external genitalia [Normal Station and Gait] : the gait and station were normal for the patient's age [] : no rash [No Focal Deficits] : no focal deficits [Oriented To Time, Place, And Person] : oriented to person, place, and time [Affect] : the affect was normal [Mood] : the mood was normal [Not Anxious] : not anxious [Femoral Lymph Nodes Enlarged Bilaterally] : femoral [FreeTextEntry1] : 1 cm smal nodule noted under umbilical area with deep palpitation

## 2023-03-27 NOTE — REVIEW OF SYSTEMS
[Feeling Poorly] : feeling poorly [Feeling Tired] : feeling tired [Recent Weight Gain (___ Lbs)] : recent [unfilled] ~Ulb weight gain [Eyesight Problems] : eyesight problems [Dry Eyes] : dryness of the eyes [Sore Throat] : sore throat [Abdominal Pain] : abdominal pain [Constipation] : constipation [see HPI] : see HPI [Arthralgias] : arthralgias [Joint Pain] : joint pain [Hot Flashes] : hot flashes [Feelings Of Weakness] : feelings of weakness [Negative] : Heme/Lymph [Fever] : no fever [Chills] : no chills [Eye Pain] : no eye pain [Red Eyes] : eyes not red [Discharge From Eyes] : no purulent discharge from the eyes [Eyes Itch] : no itching of the eyes [Earache] : no earache [Loss Of Hearing] : no hearing loss [Nosebleeds] : no nosebleeds [Nasal Discharge] : no nasal discharge [Hoarseness] : no hoarseness [Vomiting] : no vomiting [Diarrhea] : no diarrhea [Heartburn] : no heartburn [Melena] : no melena [Joint Swelling] : no joint swelling [Joint Stiffness] : no joint stiffness [Limb Pain] : no limb pain [Limb Swelling] : no limb swelling [Proptosis] : no proptosis [Muscle Weakness] : no muscle weakness [Deepening Of The Voice] : no deepening of the voice

## 2023-03-27 NOTE — HISTORY OF PRESENT ILLNESS
[FreeTextEntry1] : George Shirley MD\par \par \par CC: Pelvic pain, urinary urgency, nocturia, recurrent UTI\par \par This is a 32 year old female who presents today for pelvic pain along, urinary urgency, and nocturia x 6 .  Physical activity worsens her urinary urgency.  The pelvic pain she has been experiencing has worsened over the past 1.5 years.  She has been dealing with a 10/10 constant aching pain in her lower abdomen with no alleviating factors.  Reports feeling a bulge in her lower abdomen that is painful to touch.\par \par Denies an urinary incontinence, no pads used.  Denies any gross hematuria or dysuria  \par  \par 2 children- one vaginal one .  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  Her GYN refers her back to the PCP for her lower abdominal pain.  Believes she has had imaging completed but no available today.\par \par Since 2023 Meena reports multiple UTI ~ 4 in total.  She is evaluated at [facility] for her UTIs, no cultures available. Most recent was 2 weeks ago, she completed antibiotic course and followed back up with [facility] where she was told " infection is gone"\par \par +pain with intercourse, not sexually active at this time\par She menstruates 2 x month with heavy flow.  Treated for BV ~ 4-5 months ago.  Concerned about " smell" from vaginal area, denies any discharge \par \par \par Surgical Hx:? myomectomy 2017, hernia repair as child \par Social Hx: nonsmoker, no ETOH, works as \par Family Hx: mother with HIV and father with HIV

## 2023-03-27 NOTE — ASSESSMENT
[FreeTextEntry1] : Diagnosis: OAB, possible pelvic floor dysfunction\par \par Plan:\par PVR today was 36 cc\par Check UA and UCx\par Trial of Myrbetriq 25 mg.  Discussed goals and side effects of medication.  Would not recommend anticholinergic at this time due to underlying dry eyes and problems with constipation.\par Swab for BV\par \par Follow up with Dr. Davison

## 2023-03-31 ENCOUNTER — NON-APPOINTMENT (OUTPATIENT)
Age: 33
End: 2023-03-31

## 2023-03-31 LAB
APPEARANCE: ABNORMAL
BACTERIA UR CULT: NORMAL
BACTERIA: NEGATIVE
BILIRUBIN URINE: NEGATIVE
BLOOD URINE: NEGATIVE
COLOR: YELLOW
GLUCOSE QUALITATIVE U: NEGATIVE
HYALINE CASTS: 2 /LPF
KETONES URINE: NORMAL
LEUKOCYTE ESTERASE URINE: ABNORMAL
MICROSCOPIC-UA: NORMAL
NITRITE URINE: NEGATIVE
PH URINE: 5.5
PROTEIN URINE: ABNORMAL
RED BLOOD CELLS URINE: 1 /HPF
SPECIFIC GRAVITY URINE: 1.04
SQUAMOUS EPITHELIAL CELLS: 10 /HPF
UROBILINOGEN URINE: ABNORMAL
WHITE BLOOD CELLS URINE: 4 /HPF

## 2023-04-04 ENCOUNTER — APPOINTMENT (OUTPATIENT)
Dept: UROLOGY | Facility: CLINIC | Age: 33
End: 2023-04-04
Payer: MEDICAID

## 2023-04-04 VITALS
OXYGEN SATURATION: 99 % | HEART RATE: 76 BPM | DIASTOLIC BLOOD PRESSURE: 83 MMHG | TEMPERATURE: 97.6 F | SYSTOLIC BLOOD PRESSURE: 131 MMHG

## 2023-04-04 DIAGNOSIS — N32.81 OVERACTIVE BLADDER: ICD-10-CM

## 2023-04-04 DIAGNOSIS — R35.1 NOCTURIA: ICD-10-CM

## 2023-04-04 PROCEDURE — 99214 OFFICE O/P EST MOD 30 MIN: CPT

## 2023-04-05 PROBLEM — N32.81 OVERACTIVE BLADDER: Status: ACTIVE | Noted: 2023-03-27

## 2023-04-05 PROBLEM — R35.1 NOCTURIA: Status: ACTIVE | Noted: 2023-03-27

## 2023-04-05 NOTE — PHYSICAL EXAM
[General Appearance - Well Developed] : well developed [General Appearance - Well Nourished] : well nourished [Normal Appearance] : normal appearance [Well Groomed] : well groomed [General Appearance - In No Acute Distress] : no acute distress [Edema] : no peripheral edema [Respiration, Rhythm And Depth] : normal respiratory rhythm and effort [Exaggerated Use Of Accessory Muscles For Inspiration] : no accessory muscle use [Abdomen Soft] : soft [Abdomen Tenderness] : non-tender [Costovertebral Angle Tenderness] : no ~M costovertebral angle tenderness [Normal Station and Gait] : the gait and station were normal for the patient's age [] : no rash [Oriented To Time, Place, And Person] : oriented to person, place, and time [Affect] : the affect was normal [Mood] : the mood was normal [FreeTextEntry1] : - CST. -UH. -Prolapse. Hypertonus levators. tender on palpation.

## 2023-04-05 NOTE — ASSESSMENT
[FreeTextEntry1] : \par \par Impression/plan: 32 year-old female with OAB, CHEO, and pelvic pain. \par \par PVR 15 ml\par \par 1. UCx: r/o UTI. \par 2. F/u UDS/cysto: assess bladder function and visualize bladder lining. \par 3. Referral to PFPT. \par \par I, Dr. Avelina Davison, personally performed the evaluation and management (E/M) services for this new patient.  That E/M includes conducting the clinically appropriate initial history &/or exam, assessing all conditions, and establishing the plan of care.  Today, my ANDREW Amrit Keo, was here to observe &/or participate in the visit & follow plan of care established by me.\par \par \par

## 2023-04-05 NOTE — HISTORY OF PRESENT ILLNESS
[FreeTextEntry1] : 32 year-old female female with recurrent UTIs, pelvic pain and increased frequency and urgency of urination.\par \par PVR 15 ml  \par \par Force of stream: normal stream\par Hesitancy: no\par intermittency: no\par Dribbling:yes\par Daytime frequency: "every 2 minutes"\par Nighttime frequency: "the whole night"\par Dysuria: no\par Urgency: yes\par UUI: no\par CHEO: yes\par Pad usage: no\par Straining to void: yes\par Incomplete bladder emptying: yes\par UTI: yes\par Hematuria: yes\par Stone disease: no\par STD: BV\par Bowel Issue: constipation \par Fluid intake: water: 32 oz. herbal tea every once in a while. very rare juice intake. no soda or alcohol \par Pregnancies:  (1 vaginal, 1 . \par Prolapse sensation: yes\par loves hot and spicy food. Will have some citrus here and there\par

## 2023-04-05 NOTE — LETTER BODY
[Dear  ___] : Dear  [unfilled], [Consult Letter:] : I had the pleasure of evaluating your patient, [unfilled]. [Please see my note below.] : Please see my note below. [Consult Closing:] : Thank you very much for allowing me to participate in the care of this patient.  If you have any questions, please do not hesitate to contact me. [Sincerely,] : Sincerely, [FreeTextEntry3] : Avelina Davison MD\par System Director Urogynecology/[facility]\par Department of Urology\par [facility] \par   at [facility]\par  of Urology\par [facility] [employer] at [facility]\par

## 2023-04-07 ENCOUNTER — NON-APPOINTMENT (OUTPATIENT)
Age: 33
End: 2023-04-07

## 2023-04-18 ENCOUNTER — APPOINTMENT (OUTPATIENT)
Dept: ORTHOPEDIC SURGERY | Facility: CLINIC | Age: 33
End: 2023-04-18

## 2023-04-25 ENCOUNTER — APPOINTMENT (OUTPATIENT)
Dept: ORTHOPEDIC SURGERY | Facility: CLINIC | Age: 33
End: 2023-04-25
Payer: MEDICAID

## 2023-04-25 DIAGNOSIS — G89.29 DORSALGIA, UNSPECIFIED: ICD-10-CM

## 2023-04-25 DIAGNOSIS — M54.6 PAIN IN THORACIC SPINE: ICD-10-CM

## 2023-04-25 DIAGNOSIS — M54.50 LOW BACK PAIN, UNSPECIFIED: ICD-10-CM

## 2023-04-25 DIAGNOSIS — M54.9 DORSALGIA, UNSPECIFIED: ICD-10-CM

## 2023-04-25 DIAGNOSIS — G89.29 PAIN IN THORACIC SPINE: ICD-10-CM

## 2023-04-25 PROCEDURE — 99442: CPT

## 2023-04-26 NOTE — REASON FOR VISIT
[Home] : at home, [unfilled] , at the time of the visit. [Medical Office: ([facility])___] : at the medical office located in  [Verbal consent obtained from patient] : the patient, [unfilled] [Follow-Up Visit] : a follow-up visit for [Back Pain] : back pain

## 2023-04-27 PROBLEM — M54.50 LUMBAR BACK PAIN: Status: ACTIVE | Noted: 2023-03-20

## 2023-04-27 PROBLEM — M54.9 CHRONIC MID BACK PAIN: Status: ACTIVE | Noted: 2023-04-25

## 2023-04-27 PROBLEM — M54.6 CHRONIC MIDLINE THORACIC BACK PAIN: Status: ACTIVE | Noted: 2023-03-20

## 2023-04-27 NOTE — END OF VISIT
[Time Spent: ___ minutes] : I have spent [unfilled] minutes of time on the encounter. [FreeTextEntry3] : All medical record entries made by the Scribe were at my, Dr. Phillip Allen, direction and personally dictated by me on 04/25/2023. I have reviewed the chart and agree that the record accurately reflects my personal performance of the history, physical exam, assessment and plan. I have also personally directed, reviewed, and agreed with the chart.

## 2023-04-27 NOTE — DISCUSSION/SUMMARY
[de-identified] : Results reviewed with the patient which did not demonstrate any structural problem with the thoracic and lumbar spine. We had a long discussion about what I would recommend, which would be physical therapy targeting her core. I have also recommended that she work on finding exercises that target her core that she enjoys. The patient should follow up with me on an as needed basis. If she continues to have pain, she should follow up with physiatry.

## 2023-04-27 NOTE — HISTORY OF PRESENT ILLNESS
[de-identified] : Telephonic Follow Up 04/25/2023: Ms. Maria presents for telephonic follow up. Patient continues to have mid back pain and spasms in that region. She had a MRI of the Thoracic and Lumbar Spine on 04/01/2023. \par \par Initial visit 03/20/2023: 1 year history of mid back pain, also with low back pain.  some radiation posteriorly to the lower extremities.  no numbness/tingling.  able to exercise if needed.  ibuprofen daily, with little relief.\par \par has thyroid condition\par

## 2023-05-12 ENCOUNTER — APPOINTMENT (OUTPATIENT)
Dept: UROLOGY | Facility: CLINIC | Age: 33
End: 2023-05-12

## 2023-07-17 ENCOUNTER — APPOINTMENT (OUTPATIENT)
Dept: OTOLARYNGOLOGY | Facility: CLINIC | Age: 33
End: 2023-07-17
Payer: MEDICAID

## 2023-07-17 VITALS
WEIGHT: 183 LBS | DIASTOLIC BLOOD PRESSURE: 81 MMHG | TEMPERATURE: 98.1 F | HEART RATE: 78 BPM | SYSTOLIC BLOOD PRESSURE: 120 MMHG | HEIGHT: 68 IN | BODY MASS INDEX: 27.74 KG/M2 | OXYGEN SATURATION: 98 %

## 2023-07-17 PROCEDURE — 31579 LARYNGOSCOPY TELESCOPIC: CPT

## 2023-07-17 PROCEDURE — 99205 OFFICE O/P NEW HI 60 MIN: CPT | Mod: 25

## 2023-07-17 NOTE — PROCEDURE
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 After decongestant and anesthesia were applied the laryngoscope was passed. The nasal cavities, nasopharynx, oropharynx, hypopharynx, and larynx were all examined. Vocal folds were examined during respiration and phonation. The following findings were noted:\par \par Findings: \par Nose: Septum is midline, turbinates are normal, nasal airways patent, mucosa normal\par Nasopharynx: Adenoids normal, no masses, eustachian tube normal\par Oropharynx: Pharyngeal walls symmetric and without lesion. Tonsils/fossae symmetric\par Hypopharynx: Hypopharynx and pyriform sinuses without lesion. No masses or asymmetry. No pooling of secretions.\par Larynx: Epiglottis and aryepiglottic folds were sharp and crisp bilaterally. Bilateral false vocal folds normal appearance. Airway was widely patent.\par \par Strobe Exam Ratings\par 		\par TVF Appearance: right vocal fold cyst, additive mass lesion on the left\par TVF Mobility: normal mobility \par Edema/hypertrophy: none\par Mucus on TVF: none\par Glottic Closure: adequate\par Mucosal Wave: normal\par Amplitude of Vibration: reduced\par Phase: symmetric \par Supraglottic Hyperfunction: none\par Other Findings:\par \par Condition: Stable. Patient tolerated procedure well.\par \par Complications: None\par \par

## 2023-07-17 NOTE — ASSESSMENT
[FreeTextEntry1] : 32 year old female presents with concern for dysphonia x 1.5 years. On videostroboscopy, there was evidence of a left vocal fold additive mass lesion and right vocal fold cyst. Based on her history and exam findings, I am recommending surgical intervention including suspension microdirect laryngoscopy with excision of vocal fold lesions.\par \par Risk, benefits and alternatives of surgery were discussed including bleeding, infection, pain, risk of anesthesia, problems chewing or swallowing, changed or worsened voice, chipped teeth, tongue numbness, taste disturbance, referred ear pain, need for further procedures and possible time off of work. Pre op testing pending.\par \par - suspension microdirect laryngoscopy with excision of vocal fold lesions\par - pre op visit\par - pre op testing \par \par \par

## 2023-07-17 NOTE — PHYSICAL EXAM
[Midline] : trachea located in midline position [Normal] : no rashes [FreeTextEntry1] : Hoarse voice, reduced range, pitch control and projection

## 2023-07-17 NOTE — HISTORY OF PRESENT ILLNESS
[de-identified] : 7/17/23\par 32F with history of thyroid nodules s/p right hemithyroidectomy in 2018 at [facility] [place]. She saw another ENT 1 year ago with "2 lesions on voice box." Was scheduled for surgery but this was canceled. She presents for a second opinion. \par \par She reports intermittent voice hoarseness x 1.5 years. Also with neck swelling on the right side. + throat pain when swallowing foods. Also with sensation of things getting stuck in her throat, happens weekly. No regurgitation. No breathing issues. 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 No other ENT issues.

## 2023-08-21 ENCOUNTER — APPOINTMENT (OUTPATIENT)
Dept: OTOLARYNGOLOGY | Facility: CLINIC | Age: 33
End: 2023-08-21
Payer: MEDICAID

## 2023-08-21 VITALS
TEMPERATURE: 98.1 F | BODY MASS INDEX: 27.89 KG/M2 | WEIGHT: 184 LBS | SYSTOLIC BLOOD PRESSURE: 133 MMHG | DIASTOLIC BLOOD PRESSURE: 97 MMHG | HEIGHT: 68 IN | HEART RATE: 78 BPM

## 2023-08-21 PROCEDURE — 99215 OFFICE O/P EST HI 40 MIN: CPT | Mod: 25

## 2023-08-21 PROCEDURE — 31579 LARYNGOSCOPY TELESCOPIC: CPT

## 2023-08-21 NOTE — HISTORY OF PRESENT ILLNESS
[de-identified] : 7/17/23 32F with history of thyroid nodules [place] [facility] [place] ENT 1 year ago with "2 lesions on voice box." Was scheduled for surgery but this was canceled. She presents for a second opinion.   She reports intermittent voice hoarseness x 1.5 years. Also with neck swelling on the right side. + throat pain when swallowing foods. Also with sensation of things getting stuck in her throat, happens weekly. No regurgitation. No breathing issues. 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 No other ENT issues.  - [FreeTextEntry1] : 8/21/23 Patient with persistent dysphonia.  No new ear, nose, throat symptoms otherwise.  Patient presents for preoperative evaluation for upcoming surgery.

## 2023-08-21 NOTE — PROCEDURE
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 After decongestant and anesthesia were applied the laryngoscope was passed. The nasal cavities, nasopharynx, oropharynx, hypopharynx, and larynx were all examined. Vocal folds were examined during respiration and phonation. The following findings were noted:\par  \par  Findings: \par  Nose: Septum is midline, turbinates are normal, nasal airways patent, mucosa normal\par  Nasopharynx: Adenoids normal, no masses, eustachian tube normal\par  Oropharynx: Pharyngeal walls symmetric and without lesion. Tonsils/fossae symmetric\par  Hypopharynx: Hypopharynx and pyriform sinuses without lesion. No masses or asymmetry. No pooling of secretions.\par  Larynx: Epiglottis and aryepiglottic folds were sharp and crisp bilaterally. Bilateral false vocal folds normal appearance. Airway was widely patent.\par  \par  Strobe Exam Ratings\par  		\par  TVF Appearance: right vocal fold cyst, additive mass lesion on the left\par  TVF Mobility: normal mobility \par  Edema/hypertrophy: none\par  Mucus on TVF: none\par  Glottic Closure: adequate\par  Mucosal Wave: normal\par  Amplitude of Vibration: reduced\par  Phase: symmetric \par  Supraglottic Hyperfunction: none\par  Other Findings:\par  \par  Condition: Stable. Patient tolerated procedure well.\par  \par  Complications: None\par  \par

## 2023-08-21 NOTE — ASSESSMENT
[FreeTextEntry1] : 32 year old female presents with concern for dysphonia x 1.5 years. On videostroboscopy, there was evidence of a left vocal fold additive mass lesion and right vocal fold cyst. Based on her history and exam findings, I am recommending surgical intervention including suspension microdirect laryngoscopy with excision of vocal fold lesions.  Risk, benefits and alternatives of surgery were again discussed including bleeding, infection, pain, risk of anesthesia, problems chewing or swallowing, changed or worsened voice, chipped teeth, tongue numbness, taste disturbance, referred ear pain, need for further procedures and possible time off of work. Pre op testing pending.  - suspension microdirect laryngoscopy with excision of vocal fold lesions

## 2023-08-22 ENCOUNTER — TRANSCRIPTION ENCOUNTER (OUTPATIENT)
Age: 33
End: 2023-08-22

## 2023-08-22 NOTE — ASU PATIENT PROFILE, ADULT - NS PREOP UNDERSTANDS INFO
Instructions given regarding surgery time, location of hospital, NPO status, escort, dressing and frooming/yes 86

## 2023-08-22 NOTE — ASU PATIENT PROFILE, ADULT - NSICDXPASTSURGICALHX_GEN_ALL_CORE_FT
PAST SURGICAL HISTORY:  Endometrioma of ovary s/p resection    H/O  section     H/O hernia repair     History of partial thyroidectomy

## 2023-08-23 ENCOUNTER — APPOINTMENT (OUTPATIENT)
Dept: OTOLARYNGOLOGY | Facility: AMBULATORY SURGERY CENTER | Age: 33
End: 2023-08-23

## 2023-08-23 ENCOUNTER — OUTPATIENT (OUTPATIENT)
Dept: OUTPATIENT SERVICES | Facility: HOSPITAL | Age: 33
LOS: 1 days | Discharge: HOME | End: 2023-08-23
Payer: MEDICAID

## 2023-08-23 ENCOUNTER — RESULT REVIEW (OUTPATIENT)
Age: 33
End: 2023-08-23

## 2023-08-23 ENCOUNTER — TRANSCRIPTION ENCOUNTER (OUTPATIENT)
Age: 33
End: 2023-08-23

## 2023-08-23 VITALS
TEMPERATURE: 97 F | RESPIRATION RATE: 16 BRPM | DIASTOLIC BLOOD PRESSURE: 84 MMHG | OXYGEN SATURATION: 100 % | SYSTOLIC BLOOD PRESSURE: 146 MMHG | HEART RATE: 64 BPM

## 2023-08-23 VITALS
HEIGHT: 68 IN | OXYGEN SATURATION: 100 % | RESPIRATION RATE: 16 BRPM | DIASTOLIC BLOOD PRESSURE: 91 MMHG | TEMPERATURE: 98 F | SYSTOLIC BLOOD PRESSURE: 147 MMHG | HEART RATE: 71 BPM

## 2023-08-23 DIAGNOSIS — Z98.890 OTHER SPECIFIED POSTPROCEDURAL STATES: Chronic | ICD-10-CM

## 2023-08-23 DIAGNOSIS — N80.1 ENDOMETRIOSIS OF OVARY: Chronic | ICD-10-CM

## 2023-08-23 DIAGNOSIS — Z98.891 HISTORY OF UTERINE SCAR FROM PREVIOUS SURGERY: Chronic | ICD-10-CM

## 2023-08-23 PROCEDURE — 31571 LARYNGOSCOP W/VC INJ + SCOPE: CPT

## 2023-08-23 PROCEDURE — 88305 TISSUE EXAM BY PATHOLOGIST: CPT | Mod: 26

## 2023-08-23 PROCEDURE — 31545 REMOVE VC LESION W/SCOPE: CPT

## 2023-08-23 RX ORDER — APREPITANT 80 MG/1
40 CAPSULE ORAL ONCE
Refills: 0 | Status: COMPLETED | OUTPATIENT
Start: 2023-08-23 | End: 2023-08-23

## 2023-08-23 RX ORDER — FENTANYL CITRATE 50 UG/ML
25 INJECTION INTRAVENOUS
Refills: 0 | Status: DISCONTINUED | OUTPATIENT
Start: 2023-08-23 | End: 2023-08-23

## 2023-08-23 RX ORDER — ACETAMINOPHEN 500 MG
1000 TABLET ORAL ONCE
Refills: 0 | Status: COMPLETED | OUTPATIENT
Start: 2023-08-23 | End: 2023-08-23

## 2023-08-23 RX ORDER — SODIUM CHLORIDE 9 MG/ML
500 INJECTION, SOLUTION INTRAVENOUS
Refills: 0 | Status: DISCONTINUED | OUTPATIENT
Start: 2023-08-23 | End: 2023-08-23

## 2023-08-23 RX ADMIN — APREPITANT 40 MILLIGRAM(S): 80 CAPSULE ORAL at 11:05

## 2023-08-23 RX ADMIN — Medication 1000 MILLIGRAM(S): at 11:05

## 2023-08-31 ENCOUNTER — APPOINTMENT (OUTPATIENT)
Dept: OTOLARYNGOLOGY | Facility: CLINIC | Age: 33
End: 2023-08-31
Payer: MEDICAID

## 2023-08-31 VITALS
HEART RATE: 78 BPM | OXYGEN SATURATION: 98 % | TEMPERATURE: 97.7 F | WEIGHT: 184 LBS | SYSTOLIC BLOOD PRESSURE: 124 MMHG | HEIGHT: 68 IN | BODY MASS INDEX: 27.89 KG/M2 | DIASTOLIC BLOOD PRESSURE: 89 MMHG

## 2023-08-31 DIAGNOSIS — J38.3 OTHER DISEASES OF VOCAL CORDS: ICD-10-CM

## 2023-08-31 PROCEDURE — 31579 LARYNGOSCOPY TELESCOPIC: CPT

## 2023-08-31 PROCEDURE — 99024 POSTOP FOLLOW-UP VISIT: CPT

## 2023-08-31 NOTE — PHYSICAL EXAM
[Midline] : trachea located in midline position [Normal] : no rashes [FreeTextEntry1] : mildly Hoarse voice, reduced range, pitch control and projection

## 2023-08-31 NOTE — HISTORY OF PRESENT ILLNESS
[de-identified] : 7/17/23 32F with history of thyroid nodules [place] [facility] [place] ENT 1 year ago with "2 lesions on voice box." Was scheduled for surgery but this was canceled. She presents for a second opinion.   She reports intermittent voice hoarseness x 1.5 years. Also with neck swelling on the right side. + throat pain when swallowing foods. Also with sensation of things getting stuck in her throat, happens weekly. No regurgitation. No breathing issues. 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 No other ENT issues.  - 8/21/23 Patient with persistent dysphonia.  No new ear, nose, throat symptoms otherwise.  Patient presents for preoperative evaluation for upcoming surgery. - [FreeTextEntry1] : 8/31/2023 POD #8 s/p suspension MicroDirect laryngoscopy with excision of vocal fold lesion.  Overall stable and well.  No issues with throat pain, but does have headache and jaw discomfort.  No issues chewing, eating, swallowing.  No ear, nose, throat symptoms otherwise.

## 2023-08-31 NOTE — PROCEDURE
[de-identified] : - Procedure Note  Pre-operative Diagnosis: Dysphonia, Postop Post-operative Diagnosis:  postoperative changes with well-healing right vocal folds  Anesthesia: Topical - 1 % Lidocaine/Phenylephrine Procedure:  Flexible Laryngoscopy with Stroboscopy - CPT 39762   Procedure Details:   The patient was placed in the sitting position.  After decongestant and anesthesia were applied the laryngoscope was passed.  The nasal cavities, nasopharynx, oropharynx, hypopharynx, and larynx were all examined.  Vocal folds were examined during respiration and phonation.  The following findings were noted:  Findings:   Nose: Septum is midline, turbinates are normal, nasal airways patent, mucosa normal Nasopharynx: Adenoids normal, no masses, eustachian tube normal Oropharynx: Pharyngeal walls symmetric and without lesion. Tonsils/fossae symmetric Hypopharynx: Hypopharynx and pyriform sinuses without lesion. No masses or asymmetry.  No pooling of secretions. Larynx:  Epiglottis and aryepiglottic folds were sharp and crisp bilaterally.  Bilateral false vocal folds normal appearance. Airway was widely patent.  Strobe Exam Ratings 		 TVF Appearance:  postoperative changes with well-healing right vocal fold TVF Mobility: normal Edema/hypertrophy: normal Mucus on TVF: normal Glottic Closure: normal/adequate Mucosal Wave:  reduced Amplitude of Vibration:  reduced Phase: symmetric Supraglottic Hyperfunction: none Other Findings: none  Condition: Stable.  Patient tolerated procedure well.  Complications: None

## 2023-08-31 NOTE — ASSESSMENT
[FreeTextEntry1] : 33 year old female presents with concern for dysphonia x 1.5 years. On videostroboscopy, there was evidence of a left vocal fold additive mass lesion and right vocal fold cyst. Based on her history and exam findings, I am recommending surgical intervention including suspension microdirect laryngoscopy with excision of vocal fold lesions.  8/31/2023: POD #8 s/p suspension MicroDirect laryngoscopy with excision of vocal fold lesion.  Overall stable and well.  Pathology still pending.  At this time I am recommending voice hygiene and follow-up in 6 weeks for repeat evaluation.  In the interim I think she would benefit from voice therapy as she is having some evidence of hyperfunction as well.  –Consultation speech therapy – Voice hygiene, increased hydration – Continue antireflux medication – Follow-up 6 weeks for repeat evaluation –Awaiting pathology

## 2023-09-18 LAB — SURGICAL PATHOLOGY STUDY: SIGNIFICANT CHANGE UP

## 2023-10-06 ENCOUNTER — APPOINTMENT (OUTPATIENT)
Dept: OTOLARYNGOLOGY | Facility: CLINIC | Age: 33
End: 2023-10-06

## 2023-10-06 DIAGNOSIS — R49.0 DYSPHONIA: ICD-10-CM

## 2023-11-10 ENCOUNTER — APPOINTMENT (OUTPATIENT)
Dept: OTOLARYNGOLOGY | Facility: CLINIC | Age: 33
End: 2023-11-10

## 2023-12-01 ENCOUNTER — APPOINTMENT (OUTPATIENT)
Dept: SURGERY | Facility: CLINIC | Age: 33
End: 2023-12-01

## 2023-12-12 ENCOUNTER — APPOINTMENT (OUTPATIENT)
Dept: BARIATRICS | Facility: CLINIC | Age: 33
End: 2023-12-12
Payer: MEDICAID

## 2023-12-12 VITALS
SYSTOLIC BLOOD PRESSURE: 129 MMHG | WEIGHT: 181.13 LBS | DIASTOLIC BLOOD PRESSURE: 77 MMHG | HEIGHT: 68 IN | BODY MASS INDEX: 27.45 KG/M2 | HEART RATE: 74 BPM | TEMPERATURE: 98.1 F | OXYGEN SATURATION: 99 %

## 2023-12-12 DIAGNOSIS — N80.9 ENDOMETRIOSIS, UNSPECIFIED: ICD-10-CM

## 2023-12-12 DIAGNOSIS — R10.2 PELVIC AND PERINEAL PAIN: ICD-10-CM

## 2023-12-12 DIAGNOSIS — Z78.9 OTHER SPECIFIED HEALTH STATUS: ICD-10-CM

## 2023-12-12 PROCEDURE — 99203 OFFICE O/P NEW LOW 30 MIN: CPT

## 2023-12-13 PROBLEM — Z78.9 NON-SMOKER: Status: ACTIVE | Noted: 2023-12-12

## 2023-12-13 NOTE — HISTORY OF PRESENT ILLNESS
[de-identified] : Ms. Henry is a very pleasant 32 y/o woman here with her  who presents for consultation regarding her lower abdominal pain and bulge. She reports that she initially had a large midline surgery as a child, roughly age [age] for some sort of hernia. She is not sure of further details. She then had a  many years ago which healed uneventfully. However she was later found to have endometriosis with significant symptoms which required further surgery, possible back through the  incision. Initially she recovered well but more recently over the past 2 years she has been having pain in the lower midline abdomen. She feels it a lot of the time, but it is also worse with more physical activity such as lifting and bending. She also has chronic constipation and when straining the additional pressure also increases her lower abdominal pain. The pain does limit her activity frequently and she also needs to take Tylenol and NSAIDs regularly for it.   As for the constipation, she has tried Miralax and several other aids without improvement. No vomiting.

## 2023-12-13 NOTE — ASSESSMENT
[FreeTextEntry1] : Ms. Henry is a very pleasant 32 y/o woman with lower midline abdominal pain corresponding to an area of poorly defined thickening on exam and CT scan. It appears somewhat mass like on the CT scan, larger now than in 2020. May correspond to an endometrioma vs hyperplastic scar or suture granuloma. There could be an underlying hernia there that is being obscured by the thickened area. there is also a symptomatic incisional hernia at the level of the umbilicus as well as question of pelvic wall/right sided pelvic cyst or endometrial lesion as well. In addition there is a loop of small bowel that appears adhered to the lower midline abdominal wall. Overall any surgical intervention will need to be done open and may require small bowel resection.  Her symptoms are further exacerbated by chronic constipation. The most likely definitive treatment will be surgical however further work up is needed to plan treatment fully.

## 2023-12-13 NOTE — PLAN
[FreeTextEntry1] : 1. MRI pelvis as recommended by radiology will help to further classify the pelvic findings on CT scan 2. I will discuss with her Gyn as the surgery would need to be combined with a plan for possible endometriosis resection or ablation.  3. Recommended colace and fiber/hydration. Better control of constipation will likely slightly improve symptoms and will definitely help with post-op recovery. She may need bowel prep pre-op depending on findings on MRI.

## 2023-12-13 NOTE — PHYSICAL EXAM
[Respiratory Effort] : normal respiratory effort [Normal Rate and Rhythm] : normal rate and rhythm [Abdominal Masses] : Abdominal mass present [Abdomen Tenderness] : ~T ~M Abdominal tenderness [Alert] : alert [Oriented to Person] : oriented to person [Oriented to Place] : oriented to place [Oriented to Time] : oriented to time [Calm] : calm [JVD] : no jugular venous distention  [de-identified] : awake, alert, NAD [de-identified] : NCAT [de-identified] : audible hoarseness [de-identified] : soft, + ttp over lower midline. there is a tiny reducible incisional hernia in the large midline incision at the level of the umbilicus. There are two firm areas in the lower midline incision, the higher one corresponding to the area of thickening on the CT scan is very tender and non-mobile. no change with Valsalva. no definite fascial defect in lower abdomen on exam [de-identified] : no c/c/e

## 2023-12-13 NOTE — REASON FOR VISIT
[Consultation] : a consultation visit [FreeTextEntry1] : evaluation and management of abdominal wall pain

## 2023-12-13 NOTE — REVIEW OF SYSTEMS
What Was Performed First?: Curettage [Feeling Tired] : feeling tired [Hoarseness] : hoarseness [As Noted in HPI] : as noted in HPI [Pelvic Pain] : pelvic pain [Dysmenorrhea] : dysmenorrhea [Negative] : Heme/Lymph [Fever] : no fever [Chills] : no chills [Heart Rate Is Slow] : the heart rate was not slow [Chest Pain] : no chest pain [Palpitations] : no palpitations [Lower Ext Edema] : no lower extremity edema [Shortness Of Breath] : no shortness of breath [Wheezing] : no wheezing [Cough] : no cough [SOB on Exertion] : no shortness of breath during exertion [Easy Bleeding] : no tendency for easy bleeding [Easy Bruising] : no tendency for easy bruising [FreeTextEntry4] : recent ENT surgery this year, vocal cord [de-identified] : no AC or bleeding disorders

## 2023-12-13 NOTE — DATA REVIEWED
[FreeTextEntry1] : Reviewed 2020 and 2023 CT scans. there is upper abdominal wall 2-3 cm diastasis recti. At the umbilcal area tiny fat containing umb hernia c/w incisional hernia from pediatric surgery. lower midline with amorphous thickening of midline fascia in location of expected linea abla/diastasis. there is no visible fascial defect there (ie hernia) but may be present and obscured by this masslike thickened area. per report cannot tell if this thickening is scar or endometrioma. note also made of enlarged right cyst abutting right ovary. Per radiology on 2023 report, MRI recommended. In addition, there is a loop of small bowel that

## 2024-01-29 NOTE — ED PROVIDER NOTE - PROVIDER TOKENS
Please share these instructions with your physicians if you are seen by a physician between treatment room visits. FREE:[LAST:[Your Primary Care Physician],PHONE:[(   )    -],FAX:[(   )    -],FOLLOWUP:[1-3 Days]],FREE:[LAST:[Your OB/Gyn],PHONE:[(   )    -],FAX:[(   )    -],FOLLOWUP:[1-3 Days]] FREE:[LAST:[Your Primary Care Physician],PHONE:[(   )    -],FAX:[(   )    -],FOLLOWUP:[1-3 Days]],FREE:[LAST:[Your OB/Gyn],PHONE:[(   )    -],FAX:[(   )    -],FOLLOWUP:[1-3 Days]],PROVIDER:[TOKEN:[[id number]],FOLLOWUP:[4-6 Days]]

## 2024-03-12 ENCOUNTER — APPOINTMENT (OUTPATIENT)
Dept: BARIATRICS | Facility: CLINIC | Age: 34
End: 2024-03-12

## 2024-03-19 ENCOUNTER — APPOINTMENT (OUTPATIENT)
Dept: BARIATRICS | Facility: CLINIC | Age: 34
End: 2024-03-19
Payer: MEDICAID

## 2024-03-19 VITALS
DIASTOLIC BLOOD PRESSURE: 88 MMHG | WEIGHT: 181 LBS | TEMPERATURE: 98.6 F | SYSTOLIC BLOOD PRESSURE: 131 MMHG | HEIGHT: 68 IN | OXYGEN SATURATION: 100 % | HEART RATE: 82 BPM | BODY MASS INDEX: 27.43 KG/M2

## 2024-03-19 PROCEDURE — 99213 OFFICE O/P EST LOW 20 MIN: CPT

## 2024-03-20 NOTE — PLAN
[FreeTextEntry1] : The patient wishes to move forward with surgery as scheduled on April 22.  She has agreed to go for her pretest including seeing a PCP for overall medical optimization.  She has also agreed to go back and see Dr. Barrera again to discuss possible gynecological surgery done at the time of the abdominal mass removal.  She very clearly acknowledges that she understands that the surgery may not relieve all of her abdominal pain and would not be expected to have any effect on her constipation or urinary symptoms.

## 2024-03-20 NOTE — HISTORY OF PRESENT ILLNESS
[de-identified] : Ms. Henry is a very pleasant 34 y/o woman here with her  who presents for consultation regarding her lower abdominal pain and bulge. She reports that she initially had a large midline surgery as a child, roughly age [age] for some sort of hernia. She is not sure of further details. She then had a  many years ago which healed uneventfully. However she was later found to have endometriosis with significant symptoms which required further surgery, possible back through the  incision. Initially she recovered well but more recently over the past 2 years she has been having pain in the lower midline abdomen. She feels it a lot of the time, but it is also worse with more physical activity such as lifting and bending. She also has chronic constipation and when straining the additional pressure also increases her lower abdominal pain. The pain does limit her activity frequently and she also needs to take Tylenol and NSAIDs regularly for it.   As for the constipation, she has tried Miralax and several other aids without improvement. No vomiting.  [de-identified] : She presents today for follow-up and preoperative counseling.  She denies any significant change to her symptoms.  She continues to have bilateral lower abdominal pain most of the time.  She continues to take Tylenol and NSAIDs regularly for this pain.  She does also report some urinary frequency and difficulty controlling her bladder at times.  In addition she continues to have the chronic constipation.

## 2024-03-20 NOTE — PHYSICAL EXAM
[JVD] : no jugular venous distention  [Respiratory Effort] : normal respiratory effort [Normal Rate and Rhythm] : normal rate and rhythm [Abdominal Masses] : Abdominal mass present [Abdomen Tenderness] : ~T ~M Abdominal tenderness [Alert] : alert [Oriented to Person] : oriented to person [Oriented to Place] : oriented to place [Oriented to Time] : oriented to time [Calm] : calm [de-identified] : awake, alert, NAD [de-identified] : soft, + ttp over lower midline. there is a tiny reducible incisional hernia in the large midline incision at the level of the umbilicus. There are two firm areas in the lower midline incision, the higher one corresponding to the area of thickening on the CT scan is very tender and non-mobile. no change with Valsalva. no definite fascial defect in lower abdomen  [de-identified] : NCAT [de-identified] : no c/c/e

## 2024-03-20 NOTE — ASSESSMENT
[FreeTextEntry1] : Ms. Henry is a very pleasant 32 y/o woman with lower abdominal pain.  We discussed the MRI findings which do show a thickened area in the abdominal wall muscle corresponding with her tender area in the lower midline.  The appearance on MRI could be consistent with an endometrioma versus suture granuloma.  It does not appear likely to be a malignant lesion however she understands that that cannot be completely determined until it is excised and sent to pathology.  We discussed that her overall abdominal pain and constipation as well as the urinary symptoms do not seem to be related to this lesion in the abdominal wall.  We discussed options for moving forward including ongoing observation and pain control versus surgery.  She has also seen Dr. Sesay and is amenable to going back to see Dr. Sesay for further preoperative counseling.  We discussed the risk and benefits and expected perioperative and postop course involved with surgery.  She does wish to move forward and understands that the details of the surgery will depend a little bit on the intraoperative findings.  Overall the plan will be to go back through the little lower midline incision and excised this abdominal wall lesion.  In addition I will plan to close the umbilical hernia which is very small but will likely require mesh since it is incisional and not primary.  If there is another hernia identified in the area of the abdominal wall lesion this will also need to be repaired at that time.  I have asked Dr. Sesay for consultation during the surgery for further evaluation of possible ongoing endometriosis.

## 2024-04-11 ENCOUNTER — LABORATORY RESULT (OUTPATIENT)
Age: 34
End: 2024-04-11

## 2024-04-11 ENCOUNTER — APPOINTMENT (OUTPATIENT)
Dept: INTERNAL MEDICINE | Facility: CLINIC | Age: 34
End: 2024-04-11
Payer: MEDICAID

## 2024-04-11 ENCOUNTER — NON-APPOINTMENT (OUTPATIENT)
Age: 34
End: 2024-04-11

## 2024-04-11 VITALS
SYSTOLIC BLOOD PRESSURE: 130 MMHG | HEART RATE: 72 BPM | DIASTOLIC BLOOD PRESSURE: 77 MMHG | RESPIRATION RATE: 14 BRPM | OXYGEN SATURATION: 98 % | HEIGHT: 68 IN

## 2024-04-11 DIAGNOSIS — Z01.818 ENCOUNTER FOR OTHER PREPROCEDURAL EXAMINATION: ICD-10-CM

## 2024-04-11 PROCEDURE — 99214 OFFICE O/P EST MOD 30 MIN: CPT | Mod: 25

## 2024-04-11 PROCEDURE — 93000 ELECTROCARDIOGRAM COMPLETE: CPT

## 2024-04-11 RX ORDER — PNV NO.95/FERROUS FUM/FOLIC AC 28MG-0.8MG
1000 TABLET ORAL
Refills: 0 | Status: DISCONTINUED | COMMUNITY
End: 2024-04-11

## 2024-04-11 RX ORDER — OXYCODONE AND ACETAMINOPHEN 5; 325 MG/1; MG/1
5-325 TABLET ORAL
Qty: 10 | Refills: 0 | Status: DISCONTINUED | COMMUNITY
Start: 2023-08-23 | End: 2024-04-11

## 2024-04-11 RX ORDER — OMEPRAZOLE 20 MG/1
20 TABLET, DELAYED RELEASE ORAL
Qty: 30 | Refills: 0 | Status: DISCONTINUED | COMMUNITY
Start: 2023-08-23 | End: 2024-04-11

## 2024-04-11 RX ORDER — MECOBALAMIN 5000 MCG
5000 LOZENGE ORAL
Refills: 0 | Status: DISCONTINUED | COMMUNITY
End: 2024-04-11

## 2024-04-11 RX ORDER — MIRABEGRON 25 MG/1
25 TABLET, FILM COATED, EXTENDED RELEASE ORAL
Qty: 30 | Refills: 3 | Status: DISCONTINUED | COMMUNITY
Start: 2023-03-27 | End: 2024-04-11

## 2024-04-11 NOTE — ASSESSMENT
[Patient Optimized for Surgery] : Patient optimized for surgery [No Further Testing Recommended] : no further testing recommended [Modify medications prior to procedure] : Modify medications prior to procedure [As per surgery] : as per surgery [FreeTextEntry4] : Patient is low risk for low risk procedure  RCRI class 1, Kent score 0% EKG today NSR  Bloodwork and urine collected today  She takes no medication except occasional ibuprofen for abdominal pain - I advised she stop taking it 1 week prior to surgery.  She is not on any anticoagulants that require monitoring or bridging, etc. - form faxed to surgeon/uploaded in to chart  Reminded her to get CXR done  [FreeTextEntry7] : stop ibuprofen

## 2024-04-11 NOTE — HISTORY OF PRESENT ILLNESS
[No Pertinent Cardiac History] : no history of aortic stenosis, atrial fibrillation, coronary artery disease, recent myocardial infarction, or implantable device/pacemaker [No Pertinent Pulmonary History] : no history of asthma, COPD, sleep apnea, or smoking [No Adverse Anesthesia Reaction] : no adverse anesthesia reaction in self or family member [Chronic Anticoagulation] : no chronic anticoagulation [Chronic Kidney Disease] : no chronic kidney disease [Diabetes] : no diabetes [Excellent (>10 METs)] : Excellent (>10 METs) [FreeTextEntry1] : ventral hernia repair [FreeTextEntry2] : 4/22/24 [FreeTextEntry3] : Dr. Padgett  [FreeTextEntry4] : 33F with PMHx abdominal wall hernia who presents for preoperative clearance for ventral hernia repair with Dr. Padgett on April 22nd.  She feels well today and has some back pain from her menstrual cycle, otherwise feels well.

## 2024-04-12 LAB
ALBUMIN SERPL ELPH-MCNC: 4.6 G/DL
ALP BLD-CCNC: 110 U/L
ALT SERPL-CCNC: 27 U/L
ANION GAP SERPL CALC-SCNC: 12 MMOL/L
APPEARANCE: CLEAR
APTT BLD: 31.1 SEC
AST SERPL-CCNC: 33 U/L
BASOPHILS # BLD AUTO: 0.05 K/UL
BASOPHILS NFR BLD AUTO: 0.9 %
BILIRUB SERPL-MCNC: <0.2 MG/DL
BILIRUBIN URINE: NEGATIVE
BLOOD URINE: ABNORMAL
BUN SERPL-MCNC: 11 MG/DL
CALCIUM SERPL-MCNC: 8.9 MG/DL
CHLORIDE SERPL-SCNC: 104 MMOL/L
CO2 SERPL-SCNC: 24 MMOL/L
COLOR: YELLOW
CREAT SERPL-MCNC: 0.71 MG/DL
EGFR: 115 ML/MIN/1.73M2
EOSINOPHIL # BLD AUTO: 0.27 K/UL
EOSINOPHIL NFR BLD AUTO: 5.1 %
GLUCOSE QUALITATIVE U: NEGATIVE MG/DL
GLUCOSE SERPL-MCNC: 89 MG/DL
HCT VFR BLD CALC: 32.2 %
HGB BLD-MCNC: 9.7 G/DL
IMM GRANULOCYTES NFR BLD AUTO: 0.2 %
INR PPP: 1.03 RATIO
KETONES URINE: ABNORMAL MG/DL
LEUKOCYTE ESTERASE URINE: NEGATIVE
LYMPHOCYTES # BLD AUTO: 1.94 K/UL
LYMPHOCYTES NFR BLD AUTO: 36.7 %
MAN DIFF?: NORMAL
MCHC RBC-ENTMCNC: 23.8 PG
MCHC RBC-ENTMCNC: 30.1 GM/DL
MCV RBC AUTO: 79.1 FL
MONOCYTES # BLD AUTO: 0.41 K/UL
MONOCYTES NFR BLD AUTO: 7.8 %
NEUTROPHILS # BLD AUTO: 2.61 K/UL
NEUTROPHILS NFR BLD AUTO: 49.3 %
NITRITE URINE: NEGATIVE
PH URINE: 6
PLATELET # BLD AUTO: 314 K/UL
POTASSIUM SERPL-SCNC: 4 MMOL/L
PROT SERPL-MCNC: 7.3 G/DL
PROTEIN URINE: NORMAL MG/DL
PT BLD: 11.6 SEC
RBC # BLD: 4.07 M/UL
RBC # FLD: 16.2 %
SODIUM SERPL-SCNC: 140 MMOL/L
SPECIFIC GRAVITY URINE: >1.03
UROBILINOGEN URINE: 1 MG/DL
WBC # FLD AUTO: 5.29 K/UL

## 2024-04-15 ENCOUNTER — RESULT REVIEW (OUTPATIENT)
Age: 34
End: 2024-04-15

## 2024-04-15 ENCOUNTER — OUTPATIENT (OUTPATIENT)
Dept: OUTPATIENT SERVICES | Facility: HOSPITAL | Age: 34
LOS: 1 days | End: 2024-04-15
Payer: MEDICAID

## 2024-04-15 DIAGNOSIS — Z98.891 HISTORY OF UTERINE SCAR FROM PREVIOUS SURGERY: Chronic | ICD-10-CM

## 2024-04-15 DIAGNOSIS — N80.1 ENDOMETRIOSIS OF OVARY: Chronic | ICD-10-CM

## 2024-04-15 DIAGNOSIS — Z98.890 OTHER SPECIFIED POSTPROCEDURAL STATES: Chronic | ICD-10-CM

## 2024-04-15 PROCEDURE — 71046 X-RAY EXAM CHEST 2 VIEWS: CPT | Mod: 26

## 2024-04-15 PROCEDURE — 71046 X-RAY EXAM CHEST 2 VIEWS: CPT

## 2024-04-19 VITALS
WEIGHT: 184.09 LBS | TEMPERATURE: 98 F | OXYGEN SATURATION: 100 % | HEIGHT: 68 IN | SYSTOLIC BLOOD PRESSURE: 130 MMHG | RESPIRATION RATE: 16 BRPM | HEART RATE: 76 BPM | DIASTOLIC BLOOD PRESSURE: 87 MMHG

## 2024-04-19 RX ORDER — MIRABEGRON 50 MG/1
1 TABLET, EXTENDED RELEASE ORAL
Refills: 0 | DISCHARGE

## 2024-04-19 RX ORDER — OMEPRAZOLE 10 MG/1
1 CAPSULE, DELAYED RELEASE ORAL
Refills: 0 | DISCHARGE

## 2024-04-19 NOTE — PATIENT PROFILE ADULT - DEAF OR HARD OF HEARING?
Biopsy Type: H and E Consent: Written consent was obtained and risks were reviewed including but not limited to scarring, infection, bleeding, scabbing, incomplete removal, nerve damage and allergy to anesthesia. Electrodesiccation Text: The wound bed was treated with electrodesiccation after the biopsy was performed. Hide Anticipated Plan (Based On Presumed Biopsy Results)?: No Biopsy Method: Personna blade X Size Of Lesion In Cm: 0 Anesthesia Type: 1% lidocaine with epinephrine Validate Note Data (See Information Below): Yes Billing Type: Third-Party Bill Silver Nitrate Text: The wound bed was treated with silver nitrate after the biopsy was performed. Anesthesia Volume In Cc: 0.5 no Notification Instructions: Patient will be notified of biopsy results. However, patient instructed to call the office if not contacted within 2 weeks. Detail Level: Detailed Depth Of Biopsy: dermis Cryotherapy Text: The wound bed was treated with cryotherapy after the biopsy was performed. Information: Selecting Yes will display possible errors in your note based on the variables you have selected. This validation is only offered as a suggestion for you. PLEASE NOTE THAT THE VALIDATION TEXT WILL BE REMOVED WHEN YOU FINALIZE YOUR NOTE. IF YOU WANT TO FAX A PRELIMINARY NOTE YOU WILL NEED TO TOGGLE THIS TO 'NO' IF YOU DO NOT WANT IT IN YOUR FAXED NOTE. Hemostasis: Drysol Electrodesiccation And Curettage Text: The wound bed was treated with electrodesiccation and curettage after the biopsy was performed. Wound Care: Vaseline Dressing: bandage Post-Care Instructions: I reviewed with the patient in detail post-care instructions. Patient is to keep the biopsy site dry overnight, and then apply bacitracin twice daily until healed. Patient may apply hydrogen peroxide soaks to remove any crusting. Type Of Destruction Used: Curettage Curettage Text: The wound bed was treated with curettage after the biopsy was performed.

## 2024-04-19 NOTE — PRE-OP CHECKLIST - NS PREOP CHK MONITOR ANESTHESIA CONSENT
Outreach call for colorectal cancer screening. Message left with options and please call office to set up.
Patient would like Cologaurd sent to his home.     Please advise
done

## 2024-04-21 ENCOUNTER — TRANSCRIPTION ENCOUNTER (OUTPATIENT)
Age: 34
End: 2024-04-21

## 2024-04-22 ENCOUNTER — OUTPATIENT (OUTPATIENT)
Dept: INPATIENT UNIT | Facility: HOSPITAL | Age: 34
LOS: 1 days | Discharge: HOME | End: 2024-04-22
Payer: MEDICAID

## 2024-04-22 ENCOUNTER — TRANSCRIPTION ENCOUNTER (OUTPATIENT)
Age: 34
End: 2024-04-22

## 2024-04-22 ENCOUNTER — RESULT REVIEW (OUTPATIENT)
Age: 34
End: 2024-04-22

## 2024-04-22 ENCOUNTER — APPOINTMENT (OUTPATIENT)
Dept: SURGERY | Facility: HOSPITAL | Age: 34
End: 2024-04-22

## 2024-04-22 VITALS — RESPIRATION RATE: 18 BRPM | OXYGEN SATURATION: 96 % | HEART RATE: 86 BPM

## 2024-04-22 DIAGNOSIS — Z98.891 HISTORY OF UTERINE SCAR FROM PREVIOUS SURGERY: Chronic | ICD-10-CM

## 2024-04-22 DIAGNOSIS — N80.9 ENDOMETRIOSIS, UNSPECIFIED: ICD-10-CM

## 2024-04-22 DIAGNOSIS — N80.1 ENDOMETRIOSIS OF OVARY: Chronic | ICD-10-CM

## 2024-04-22 DIAGNOSIS — Z98.890 OTHER SPECIFIED POSTPROCEDURAL STATES: Chronic | ICD-10-CM

## 2024-04-22 PROCEDURE — 88305 TISSUE EXAM BY PATHOLOGIST: CPT

## 2024-04-22 PROCEDURE — C9399: CPT

## 2024-04-22 PROCEDURE — 49203: CPT | Mod: GC

## 2024-04-22 PROCEDURE — 49616 RPR AA HRN RCR 3-10 NCR/STRN: CPT | Mod: 59,GC

## 2024-04-22 PROCEDURE — 49203: CPT

## 2024-04-22 PROCEDURE — 88305 TISSUE EXAM BY PATHOLOGIST: CPT | Mod: 26

## 2024-04-22 RX ORDER — HYDROMORPHONE HYDROCHLORIDE 2 MG/ML
0.2 INJECTION INTRAMUSCULAR; INTRAVENOUS; SUBCUTANEOUS
Refills: 0 | Status: DISCONTINUED | OUTPATIENT
Start: 2024-04-22 | End: 2024-04-22

## 2024-04-22 RX ORDER — TRAMADOL HYDROCHLORIDE 50 MG/1
1 TABLET ORAL
Qty: 5 | Refills: 0
Start: 2024-04-22 | End: 2024-04-23

## 2024-04-22 RX ORDER — ONDANSETRON 8 MG/1
1 TABLET, FILM COATED ORAL
Qty: 8 | Refills: 0
Start: 2024-04-22 | End: 2024-04-23

## 2024-04-22 NOTE — ASU DISCHARGE PLAN (ADULT/PEDIATRIC) - CARE PROVIDER_API CALL
Chen Padgett  Surgery  [address] [place] [address], [address]  [place] [address]  Phone: [phone]  Fax: [phone]  Follow Up Time: 2 weeks

## 2024-04-22 NOTE — ASU DISCHARGE PLAN (ADULT/PEDIATRIC) - ASU DC SPECIAL INSTRUCTIONSFT
You should take tylenol and motrin every 3 hours around the clock, alternating which one you take at each dose.  For example, if at 12:00 you take 650 mg of Tylenol, you should take 400 mg of Motrin at 3:00.  Then, at 6:00 you should take 650 mg of Tylenol again.  Then, at 9:00 you can take 400 mg of Motrin again.

## 2024-04-22 NOTE — PROGRESS NOTE ADULT - SUBJECTIVE AND OBJECTIVE BOX
Procedure: Endometroma excision, ventral hernia repair  Surgeon: Dr. Padgett    S: Pt has no complaints.  Tolerating diet, ooba, Pain controlled with medication. Denies CP, SOB, CARMICHAEL, calf tenderness.     O:  T(C): 36.3 (04-22-24 @ 09:08), Max: 36.3 (04-22-24 @ 09:08)  T(F): 97.3 (04-22-24 @ 09:08), Max: 97.3 (04-22-24 @ 09:08)  HR: 71 (04-22-24 @ 12:00) (63 - 77)  BP: 134/72 (04-22-24 @ 11:38) (121/80 - 134/72)  RR: 20 (04-22-24 @ 12:00) (16 - 20)  SpO2: 100% (04-22-24 @ 12:00) (100% - 100%)  Wt(kg): --            Gen: NAD, resting comfortably in bed  C/V: NSR  Pulm: Nonlabored breathing, no respiratory distress  Abd: soft, NT/ND. No rebound or guarding   Incision: c/d/i  Extrem: WWP, no calf edema, SCDs in place      A/P: 33yFemale s/p above procedure endometrioma excision and ventral hernia repair  Diet:reg  Pain/nausea control  DVT ppx: scd  Dispo plan: d/c home

## 2024-04-22 NOTE — BRIEF OPERATIVE NOTE - OPERATION/FINDINGS
foreign body of the abdominal wall caudal to the umbilicus, consistent with endometrioma  fascial defect repaired primarily with 0 PDS in a running fashion

## 2024-04-22 NOTE — ASU DISCHARGE PLAN (ADULT/PEDIATRIC) - NS MD DC FALL RISK RISK
For information on Fall & Injury Prevention, visit: [email or web address] OR  [email or web address] OR  https://www.cdc.gov/steadi/patient.html

## 2024-04-22 NOTE — BRIEF OPERATIVE NOTE - NSICDXBRIEFPOSTOP_GEN_ALL_CORE_FT
POST-OP DIAGNOSIS:  Ventral hernia 22-Apr-2024 09:33:46  Sami Gonzalez  Endometriosis 22-Apr-2024 09:33:56  Sami Gonzalez

## 2024-04-25 RX ORDER — MIRABEGRON 25 MG/1
25 TABLET, FILM COATED, EXTENDED RELEASE ORAL DAILY
Qty: 1 | Refills: 0 | Status: DISCONTINUED | COMMUNITY
Start: 2024-04-25 | End: 2024-04-25

## 2024-04-29 RX ORDER — TAMSULOSIN HYDROCHLORIDE 0.4 MG/1
0.4 CAPSULE ORAL DAILY
Qty: 5 | Refills: 0 | Status: ACTIVE | COMMUNITY
Start: 2024-04-29 | End: 1900-01-01

## 2024-04-29 RX ORDER — MIRABEGRON 25 MG/1
25 TABLET, FILM COATED, EXTENDED RELEASE ORAL
Qty: 5 | Refills: 0 | Status: DISCONTINUED | COMMUNITY
Start: 2024-04-25 | End: 2024-04-29

## 2024-04-30 PROBLEM — Z87.19 PERSONAL HISTORY OF OTHER DISEASES OF THE DIGESTIVE SYSTEM: Chronic | Status: ACTIVE | Noted: 2024-04-19

## 2024-05-01 LAB — SURGICAL PATHOLOGY STUDY: SIGNIFICANT CHANGE UP

## 2024-05-01 NOTE — PATIENT PROFILE ADULT - NSPROPOAPRESSUREINJURY_GEN_A_NUR
Quality 226: Preventive Care And Screening: Tobacco Use: Screening And Cessation Intervention: Patient screened for tobacco use and is an ex/non-smoker Quality 431: Preventive Care And Screening: Unhealthy Alcohol Use - Screening: Patient not identified as an unhealthy alcohol user when screened for unhealthy alcohol use using a systematic screening method Quality 130: Documentation Of Current Medications In The Medical Record: Current Medications Documented Detail Level: Detailed no

## 2024-05-01 NOTE — ED PROVIDER NOTE - INTERNATIONAL TRAVEL
Left detailed message for patient with information from below. Patient is to call with any questions or concerns.    No

## 2024-05-07 ENCOUNTER — APPOINTMENT (OUTPATIENT)
Dept: BARIATRICS | Facility: CLINIC | Age: 34
End: 2024-05-07

## 2024-05-14 ENCOUNTER — APPOINTMENT (OUTPATIENT)
Dept: UROLOGY | Facility: CLINIC | Age: 34
End: 2024-05-14
Payer: MEDICAID

## 2024-05-14 ENCOUNTER — RESULT REVIEW (OUTPATIENT)
Age: 34
End: 2024-05-14

## 2024-05-14 DIAGNOSIS — N39.0 URINARY TRACT INFECTION, SITE NOT SPECIFIED: ICD-10-CM

## 2024-05-14 PROCEDURE — 99213 OFFICE O/P EST LOW 20 MIN: CPT

## 2024-05-15 NOTE — PHYSICAL EXAM
[General Appearance - Well Developed] : well developed [General Appearance - Well Nourished] : well nourished [Normal Appearance] : normal appearance [Well Groomed] : well groomed [General Appearance - In No Acute Distress] : no acute distress [] : no respiratory distress [Exaggerated Use Of Accessory Muscles For Inspiration] : no accessory muscle use [Normal Station and Gait] : the gait and station were normal for the patient's age [Skin Color & Pigmentation] : normal skin color and pigmentation [No Focal Deficits] : no focal deficits [Oriented To Time, Place, And Person] : oriented to person, place, and time [Affect] : the affect was normal [Mood] : the mood was normal [Not Anxious] : not anxious

## 2024-05-18 NOTE — ASSESSMENT
[FreeTextEntry1] :   Impression/plan: 33-year-old female with recurrent UTIs.   1. Patient will send prior culture results from Urgent Care 2. Urine culture-patient is unable to provide sample today. Script provided to drop off sample at a local lab. 3. Renal ultrasound 4. F/u for cystoscopy 5. Will discuss preventive plan to reduce infections after all testing  I, Dr. Avelina Davison, personally performed the evaluation and management (E/M) services for this established patient who presents today with (a) new problem(s)/exacerbation of (an) existing condition(s).  That E/M includes conducting the clinically appropriate interval history &/or exam, assessing all new/exacerbated conditions, and establishing a new plan of care.  Today, my ANDREW Estrella, was here to observe &/or participate in the visit & follow plan of care established by me.

## 2024-05-18 NOTE — HISTORY OF PRESENT ILLNESS
[FreeTextEntry1] : 32 year-old female female with recurrent UTIs, pelvic pain and increased frequency and urgency of urination.    PVR 15 ml    Force of stream: normal stream  Hesitancy: no  intermittency: no  Dribbling:yes  Daytime frequency: "every 2 minutes"  Nighttime frequency: "the whole night"  Dysuria: no  Urgency: yes  UUI: no  CHEO: yes  Pad usage: no  Straining to void: yes  Incomplete bladder emptying: yes  UTI: yes  Hematuria: yes  Stone disease: no  STD: BV  Bowel Issue: constipation  Fluid intake: water: 32 oz. herbal tea every once in a while. very rare juice intake. no soda or alcohol  Pregnancies:  (1 vaginal, 1 .  Prolapse sensation: yes  loves hot and spicy food. Will have some citrus here and there   24 33-year-old female here today for f/u. She was seen 1 year ago with OAB, CHEO and pelvic pain. Today she is here due to recurrent UTIs x 2-3 years. States she has 4-5 per year and noticed a correlation with sexual activity. She showers before and after intercourse but UTIs persist. She has taken probiotics and azo without improvement. Her usual UTI symptoms are urgency and frequency. She usually gets urine testing at urgent care. Last treated for UTI 2 weeks ago. She is asymptomatic today. She had a plan for cystoscopy and PFPT 1 year ago, but she did not follow up.   She had a hernia repair and removal of an endometrial lesion on 24.

## 2024-05-18 NOTE — LETTER BODY
[Dear  ___] : Dear  [unfilled], [Courtesy Letter:] : I had the pleasure of seeing your patient, [unfilled], in my office today. [Please see my note below.] : Please see my note below. [Consult Closing:] : Thank you very much for allowing me to participate in the care of this patient.  If you have any questions, please do not hesitate to contact me. [Sincerely,] : Sincerely, [FreeTextEntry3] : Avelina Davison MD System Director Urogynecology/FPS Department of Urology [facility]    at [facility]  of Urology [facility] [employer] [facility] at [facility]

## 2024-05-21 ENCOUNTER — APPOINTMENT (OUTPATIENT)
Dept: SURGERY | Facility: CLINIC | Age: 34
End: 2024-05-21

## 2024-05-21 VITALS
BODY MASS INDEX: 27.43 KG/M2 | TEMPERATURE: 97.9 F | SYSTOLIC BLOOD PRESSURE: 129 MMHG | WEIGHT: 181 LBS | OXYGEN SATURATION: 99 % | HEART RATE: 82 BPM | DIASTOLIC BLOOD PRESSURE: 84 MMHG | HEIGHT: 68 IN

## 2024-05-21 PROCEDURE — 99024 POSTOP FOLLOW-UP VISIT: CPT

## 2024-05-24 NOTE — HISTORY OF PRESENT ILLNESS
[de-identified] : The patient presents for postoperative evaluation.  She was previously having some urinary symptoms quite a while ago at this point and we did the initial postop check and over the phone.  She has since seen urology and been taking medication and those symptoms have improved.  In addition no symptoms were present preoperatively.  Today we discussed specifically her abdominal symptoms.  She is reporting less abdominal pain and especially feels much better in the area of the surgical site.  Her bowel movements have been good and she has been taking treatment to avoid too much constipation but acknowledges that she could do more.  She denies any nausea or vomiting fevers or chills or drainage from the surgical site.

## 2024-05-24 NOTE — PLAN
[FreeTextEntry1] : We reviewed the operative findings today.  I did tell her that I had queried some gynecologists regarding this endometrial implants which is pathologically confirmed.  She reports that since her symptoms are so much better right now she feels comfortable just keeping an eye on things but will seek out referral with gynecology if her symptoms increase again in the future.

## 2024-05-24 NOTE — PHYSICAL EXAM
[de-identified] : well, comfortable. ,NAD [de-identified] :  she had soft nontender nondistended.  There is a small firm area consistent with healing wound in the incision site.  No palpable nodules no redness

## 2024-06-18 ENCOUNTER — APPOINTMENT (OUTPATIENT)
Dept: ULTRASOUND IMAGING | Facility: HOSPITAL | Age: 34
End: 2024-06-18
Payer: MEDICAID

## 2024-06-18 ENCOUNTER — OUTPATIENT (OUTPATIENT)
Dept: OUTPATIENT SERVICES | Facility: HOSPITAL | Age: 34
LOS: 1 days | End: 2024-06-18

## 2024-06-18 DIAGNOSIS — Z98.891 HISTORY OF UTERINE SCAR FROM PREVIOUS SURGERY: Chronic | ICD-10-CM

## 2024-06-18 DIAGNOSIS — Z98.890 OTHER SPECIFIED POSTPROCEDURAL STATES: Chronic | ICD-10-CM

## 2024-06-18 DIAGNOSIS — N80.1 ENDOMETRIOSIS OF OVARY: Chronic | ICD-10-CM

## 2024-06-18 PROCEDURE — 76770 US EXAM ABDO BACK WALL COMP: CPT

## 2024-06-18 PROCEDURE — 76770 US EXAM ABDO BACK WALL COMP: CPT | Mod: 26

## 2024-07-11 ENCOUNTER — APPOINTMENT (OUTPATIENT)
Dept: UROLOGY | Facility: CLINIC | Age: 34
End: 2024-07-11

## 2024-07-26 ENCOUNTER — APPOINTMENT (OUTPATIENT)
Dept: UROLOGY | Facility: CLINIC | Age: 34
End: 2024-07-26

## 2024-08-09 ENCOUNTER — APPOINTMENT (OUTPATIENT)
Dept: UROLOGY | Facility: CLINIC | Age: 34
End: 2024-08-09

## 2024-08-30 ENCOUNTER — APPOINTMENT (OUTPATIENT)
Dept: UROLOGY | Facility: CLINIC | Age: 34
End: 2024-08-30

## 2024-09-20 ENCOUNTER — APPOINTMENT (OUTPATIENT)
Dept: UROLOGY | Facility: CLINIC | Age: 34
End: 2024-09-20

## 2025-05-15 NOTE — ED ADULT NURSE NOTE - CHIEF COMPLAINT QUOTE
right eye pain s/p accidentally poking herself in the eye today, denies vision changes Photo Preface (Leave Blank If You Do Not Want): Photo was obtained today Detail Level: Zone

## (undated) DEVICE — Device

## (undated) DEVICE — SUCTION CATH ARGYLE WHISTLE TIP 14FR STRAIGHT PACKED

## (undated) DEVICE — SUT PROLENE 3-0 18" PS-1

## (undated) DEVICE — DRAPE LAPAROTOMY W VELCRO CORD TABS

## (undated) DEVICE — DRAPE TOWEL BLUE 17" X 24"

## (undated) DEVICE — ELCTR GROUNDING PAD ADULT COVIDIEN

## (undated) DEVICE — GOWN ROYAL SILK XL

## (undated) DEVICE — ELCTR BOVIE PENCIL HANDPIECE

## (undated) DEVICE — DRSG TELFA 3 X 8

## (undated) DEVICE — GOWN XL LEVEL 3

## (undated) DEVICE — SUT VICRYL 3-0 27" SH UNDYED

## (undated) DEVICE — SOL ANTI FOG

## (undated) DEVICE — SUT NDL MAYO CATGUT 1/2 CIRCLE TAPER POINT 0.050" X 0.897"

## (undated) DEVICE — SPECIMEN CONTAINER 4OZ

## (undated) DEVICE — SYR LUER LOK 10CC

## (undated) DEVICE — BOWL SOL 32OZ LG STRL

## (undated) DEVICE — DRAPE LAPAROTOMY TRANSVERSE

## (undated) DEVICE — SYR SLIP LOCK 1CC

## (undated) DEVICE — SUT PDS PLUS 1 54" TP-1

## (undated) DEVICE — DRAPE MAYO STAND 23"

## (undated) DEVICE — BLADE SURGICAL #10 CARBON

## (undated) DEVICE — TUBING RAPIDVAC SMOKE EVACUATOR .25" X 10FT

## (undated) DEVICE — PACK BASIC TIBURON LTXF STRL

## (undated) DEVICE — DRAPE SPLIT SHEET 77" X 108"

## (undated) DEVICE — SUT PDS II PLUS 0 36" CT-1

## (undated) DEVICE — GLV 7 PROTEXIS (WHITE)

## (undated) DEVICE — GLV 7.5 PROTEXIS (WHITE)

## (undated) DEVICE — REUSABLE OROTRACHEAL LARYNGEAL INJECTOR NEEDLE

## (undated) DEVICE — TUBING SUCTION NONCONDUCTIVE 6MM X 12FT

## (undated) DEVICE — NDL WILLIAMS CYSTOSCOPIC INJECTION 5FR 23G X 35CM

## (undated) DEVICE — SPEAR SURG EYE WECK-CELL CELOS

## (undated) DEVICE — SUT PROLENE 2-0 30" CT-2

## (undated) DEVICE — VENODYNE/SCD SLEEVE CALF MEDIUM

## (undated) DEVICE — NDL HYPO REGULAR BEVEL 25G X 1.5" (BLUE)

## (undated) DEVICE — SYR LUER LOK 5CC

## (undated) DEVICE — SUT MONOCRYL PLUS 4-0 18" PS-2 UNDYED

## (undated) DEVICE — GLV 6.5 PROTEXIS (WHITE)

## (undated) DEVICE — DRSG STERISTRIPS 0.5 X 4"

## (undated) DEVICE — PREP CHLORAPREP HI-LITE ORANGE 26ML

## (undated) DEVICE — BLADE SURGICAL #15 CARBON